# Patient Record
Sex: FEMALE | Race: BLACK OR AFRICAN AMERICAN | NOT HISPANIC OR LATINO | Employment: OTHER | URBAN - METROPOLITAN AREA
[De-identification: names, ages, dates, MRNs, and addresses within clinical notes are randomized per-mention and may not be internally consistent; named-entity substitution may affect disease eponyms.]

---

## 2019-05-21 PROBLEM — D63.1 ANEMIA DUE TO CHRONIC KIDNEY DISEASE: Status: ACTIVE | Noted: 2019-05-21

## 2019-05-21 PROBLEM — N18.9 ANEMIA DUE TO CHRONIC KIDNEY DISEASE: Status: ACTIVE | Noted: 2019-05-21

## 2019-07-09 DIAGNOSIS — Z76.82 ORGAN TRANSPLANT CANDIDATE: Primary | ICD-10-CM

## 2019-07-15 ENCOUNTER — TELEPHONE (OUTPATIENT)
Dept: TRANSPLANT | Facility: CLINIC | Age: 69
End: 2019-07-15

## 2019-08-01 DIAGNOSIS — Z76.82 ORGAN TRANSPLANT CANDIDATE: Primary | ICD-10-CM

## 2019-09-19 ENCOUNTER — HOSPITAL ENCOUNTER (OUTPATIENT)
Dept: RADIOLOGY | Facility: HOSPITAL | Age: 69
Discharge: HOME | End: 2019-09-19
Attending: NURSE PRACTITIONER
Payer: MEDICARE

## 2019-09-19 ENCOUNTER — CLINICAL SUPPORT (OUTPATIENT)
Dept: INFECTIOUS DISEASES | Facility: CLINIC | Age: 69
End: 2019-09-19
Payer: MEDICARE

## 2019-09-19 ENCOUNTER — OFFICE VISIT (OUTPATIENT)
Dept: TRANSPLANT | Facility: CLINIC | Age: 69
End: 2019-09-19
Payer: MEDICARE

## 2019-09-19 VITALS
BODY MASS INDEX: 34.85 KG/M2 | SYSTOLIC BLOOD PRESSURE: 173 MMHG | WEIGHT: 209.19 LBS | DIASTOLIC BLOOD PRESSURE: 83 MMHG | HEART RATE: 75 BPM | TEMPERATURE: 98 F | OXYGEN SATURATION: 97 % | HEIGHT: 65 IN | RESPIRATION RATE: 18 BRPM

## 2019-09-19 DIAGNOSIS — Z01.818 PRE-TRANSPLANT EVALUATION FOR CHRONIC KIDNEY DISEASE: ICD-10-CM

## 2019-09-19 DIAGNOSIS — Z76.82 ORGAN TRANSPLANT CANDIDATE: ICD-10-CM

## 2019-09-19 DIAGNOSIS — E11.22 CONTROLLED TYPE 2 DIABETES MELLITUS WITH STAGE 4 CHRONIC KIDNEY DISEASE, WITH LONG-TERM CURRENT USE OF INSULIN: ICD-10-CM

## 2019-09-19 DIAGNOSIS — I25.2 HISTORY OF MYOCARDIAL INFARCTION: Chronic | ICD-10-CM

## 2019-09-19 DIAGNOSIS — N18.4 CKD (CHRONIC KIDNEY DISEASE), STAGE IV: Primary | ICD-10-CM

## 2019-09-19 DIAGNOSIS — E78.2 MIXED HYPERLIPIDEMIA: ICD-10-CM

## 2019-09-19 DIAGNOSIS — N18.4 CONTROLLED TYPE 2 DIABETES MELLITUS WITH STAGE 4 CHRONIC KIDNEY DISEASE, WITH LONG-TERM CURRENT USE OF INSULIN: ICD-10-CM

## 2019-09-19 DIAGNOSIS — Z79.4 CONTROLLED TYPE 2 DIABETES MELLITUS WITH STAGE 4 CHRONIC KIDNEY DISEASE, WITH LONG-TERM CURRENT USE OF INSULIN: ICD-10-CM

## 2019-09-19 PROBLEM — I50.9 CHF (CONGESTIVE HEART FAILURE): Status: ACTIVE | Noted: 2019-09-19

## 2019-09-19 PROBLEM — I21.9 MYOCARDIAL INFARCTION: Chronic | Status: ACTIVE | Noted: 2019-09-19

## 2019-09-19 PROBLEM — Z86.79 HISTORY OF CHF (CONGESTIVE HEART FAILURE): Status: ACTIVE | Noted: 2019-09-19

## 2019-09-19 PROBLEM — I50.9 CHF (CONGESTIVE HEART FAILURE): Status: RESOLVED | Noted: 2019-09-19 | Resolved: 2019-09-19

## 2019-09-19 PROBLEM — E78.5 HYPERLIPIDEMIA: Status: ACTIVE | Noted: 2019-09-19

## 2019-09-19 PROCEDURE — 99205 PR OFFICE/OUTPT VISIT, NEW, LEVL V, 60-74 MIN: ICD-10-PCS | Mod: S$PBB,TXP,, | Performed by: INTERNAL MEDICINE

## 2019-09-19 PROCEDURE — 99204 OFFICE O/P NEW MOD 45 MIN: CPT | Mod: S$PBB,TXP,, | Performed by: PHYSICIAN ASSISTANT

## 2019-09-19 PROCEDURE — 71046 XR CHEST PA AND LATERAL: ICD-10-PCS | Mod: 26,TXP,, | Performed by: RADIOLOGY

## 2019-09-19 PROCEDURE — 71046 X-RAY EXAM CHEST 2 VIEWS: CPT | Mod: TC,TXP

## 2019-09-19 PROCEDURE — 93978 VASCULAR STUDY: CPT | Mod: 26,TXP,, | Performed by: RADIOLOGY

## 2019-09-19 PROCEDURE — 71046 X-RAY EXAM CHEST 2 VIEWS: CPT | Mod: 26,TXP,, | Performed by: RADIOLOGY

## 2019-09-19 PROCEDURE — 72170 X-RAY EXAM OF PELVIS: CPT | Mod: 26,TXP,, | Performed by: RADIOLOGY

## 2019-09-19 PROCEDURE — 90750 HZV VACC RECOMBINANT IM: CPT | Mod: PBBFAC,TXP

## 2019-09-19 PROCEDURE — 90662 IIV NO PRSV INCREASED AG IM: CPT | Mod: PBBFAC,TXP

## 2019-09-19 PROCEDURE — 72170 XR PELVIS ROUTINE AP: ICD-10-PCS | Mod: 26,TXP,, | Performed by: RADIOLOGY

## 2019-09-19 PROCEDURE — 72170 X-RAY EXAM OF PELVIS: CPT | Mod: TC,TXP

## 2019-09-19 PROCEDURE — G0008 ADMIN INFLUENZA VIRUS VAC: HCPCS | Mod: PBBFAC,TXP

## 2019-09-19 PROCEDURE — 99999 PR PBB SHADOW E&M-EST. PATIENT-LVL IV: ICD-10-PCS | Mod: PBBFAC,TXP,, | Performed by: INTERNAL MEDICINE

## 2019-09-19 PROCEDURE — 99204 PR OFFICE/OUTPT VISIT, NEW, LEVL IV, 45-59 MIN: ICD-10-PCS | Mod: S$PBB,TXP,, | Performed by: PHYSICIAN ASSISTANT

## 2019-09-19 PROCEDURE — 99214 OFFICE O/P EST MOD 30 MIN: CPT | Mod: PBBFAC,25,TXP | Performed by: INTERNAL MEDICINE

## 2019-09-19 PROCEDURE — 97802 MEDICAL NUTRITION INDIV IN: CPT | Mod: PBBFAC,TXP | Performed by: DIETITIAN, REGISTERED

## 2019-09-19 PROCEDURE — 99999 PR PBB SHADOW E&M-EST. PATIENT-LVL IV: CPT | Mod: PBBFAC,TXP,, | Performed by: INTERNAL MEDICINE

## 2019-09-19 PROCEDURE — 93978 US DOPP ILIACS BILATERAL: ICD-10-PCS | Mod: 26,TXP,, | Performed by: RADIOLOGY

## 2019-09-19 PROCEDURE — 99203 OFFICE O/P NEW LOW 30 MIN: CPT | Mod: S$PBB,TXP,, | Performed by: TRANSPLANT SURGERY

## 2019-09-19 PROCEDURE — 76770 US EXAM ABDO BACK WALL COMP: CPT | Mod: TC,TXP

## 2019-09-19 PROCEDURE — 90471 IMMUNIZATION ADMIN: CPT | Mod: PBBFAC,TXP

## 2019-09-19 PROCEDURE — 99203 PR OFFICE/OUTPT VISIT, NEW, LEVL III, 30-44 MIN: ICD-10-PCS | Mod: S$PBB,TXP,, | Performed by: TRANSPLANT SURGERY

## 2019-09-19 PROCEDURE — 99205 OFFICE O/P NEW HI 60 MIN: CPT | Mod: S$PBB,TXP,, | Performed by: INTERNAL MEDICINE

## 2019-09-19 PROCEDURE — G0010 ADMIN HEPATITIS B VACCINE: HCPCS | Mod: PBBFAC,TXP

## 2019-09-19 PROCEDURE — 93978 VASCULAR STUDY: CPT | Mod: TC,TXP

## 2019-09-19 PROCEDURE — 76770 US EXAM ABDO BACK WALL COMP: CPT | Mod: 26,TXP,, | Performed by: RADIOLOGY

## 2019-09-19 PROCEDURE — 76770 US RETROPERITONEAL COMPLETE: ICD-10-PCS | Mod: 26,TXP,, | Performed by: RADIOLOGY

## 2019-09-19 RX ORDER — ACETAMINOPHEN 500 MG
2000 TABLET ORAL DAILY
COMMUNITY

## 2019-09-19 RX ORDER — NIFEDIPINE 60 MG/1
60 TABLET, EXTENDED RELEASE ORAL 2 TIMES DAILY
COMMUNITY
End: 2019-11-04 | Stop reason: CLARIF

## 2019-09-19 RX ORDER — ACETAMINOPHEN AND CODEINE PHOSPHATE 300; 30 MG/1; MG/1
1 TABLET ORAL EVERY 6 HOURS PRN
COMMUNITY
End: 2019-12-05

## 2019-09-19 RX ORDER — DOCUSATE SODIUM 100 MG/1
100 CAPSULE, LIQUID FILLED ORAL DAILY
COMMUNITY
End: 2019-12-05

## 2019-09-19 RX ORDER — LORATADINE 10 MG/1
10 TABLET ORAL DAILY
COMMUNITY
End: 2019-11-15 | Stop reason: SDUPTHER

## 2019-09-19 NOTE — PROGRESS NOTES
PHARM.D. PRE-TRANSPLANT NOTE:    This patient's medication therapy was evaluated as part of her pre-transplant evaluation.      The following general pharmacologic concerns were noted: aspirin 81mg     The following pharmacologic concerns related to HCV therapy were noted: none      This patient's medication profile was reviewed for contraindications for DAA Hepatitis C therapy:    [x]  No current inducers of CYP 3A4 or PGP  [x]  No amiodarone on this patient's EMR profile in the last 24 months  [x]  No past or current atrial fibrillation on this patient's EMR profile       Current Outpatient Medications   Medication Sig Dispense Refill    acetaminophen-codeine 300-30mg (TYLENOL #3) 300-30 mg Tab Take 1 tablet by mouth every 6 (six) hours as needed (pain).      aspirin 81 MG Chew Take 81 mg by mouth once daily.      atorvastatin (LIPITOR) 80 MG tablet Take 80 mg by mouth once daily.      butalbital-acetaminophen-caffeine -40 mg (FIORICET, ESGIC) -40 mg per tablet Take 1 tablet by mouth every 4 (four) hours as needed for Pain.      carvedilol (COREG) 25 MG tablet Take 25 mg by mouth 2 (two) times daily with meals.      cloNIDine (CATAPRES) 0.2 MG tablet Take 0.2 mg by mouth 2 (two) times daily.       clopidogrel (PLAVIX) 75 mg tablet Take 75 mg by mouth once daily.      docusate sodium (COLACE) 100 MG capsule Take 100 mg by mouth once daily.      doxazosin (CARDURA) 4 MG tablet Take 4 mg by mouth 2 (two) times daily.      fish oil-omega-3 fatty acids 300-1,000 mg capsule Take by mouth 2 (two) times daily.      furosemide (LASIX) 80 MG tablet Take 80 mg by mouth once daily.       insulin degludec (TRESIBA FLEXTOUCH U-100) 100 unit/mL (3 mL) InPn Inject 30 Units into the skin 2 (two) times daily.       isosorbide mononitrate (IMDUR) 60 MG 24 hr tablet Take 60 mg by mouth once daily.      loratadine (CLARITIN) 10 mg tablet Take 10 mg by mouth once daily.      NIFEdipine (PROCARDIA-XL) 60 MG  (OSM) 24 hr tablet Take 60 mg by mouth 2 (two) times daily.      SITagliptin (JANUVIA) 25 MG Tab Take 25 mg by mouth once daily.      vitamin D (VITAMIN D3) 1000 units Tab Take 1,000 Units by mouth once daily.       No current facility-administered medications for this visit.          Currently Ms Swain is responsible for preparing / administering this patient's medications on a daily basis.  I am available for consultation and can be contacted, as needed by the other members of the Kidney Transplant team.

## 2019-09-19 NOTE — PROGRESS NOTES
Transplant Surgery  Kidney Transplant Recipient Evaluation    Referring Physician: Dane Varghese  Current Nephrologist: Dane Varghese    Subjective:     Reason for Visit: evaluate transplant candidacy    History of Present Illness: Latia Swain is a 69 y.o. year old female undergoing transplant evaluation.    Dialysis History: Latia is pre-dialysis.      Transplant History: N/A    Etiology of Renal Disease: Diabetes Mellitus - Type II (based on medical records from referral).    Review of Systems    Objective:     Physical Exam:  Constitutional:   Vitals reviewed: yes   Well-nourished and well-groomed: yes  Eyes:   Sclerae icteric: no   Extraocular movements intact: yes  GI:    Bowel sounds normal: yes   Tenderness: no    If yes, quadrant/location: not applicable   Palpable masses: no    If yes, quadrant/location: not applicable   Hepatosplenomegaly: no   Ascites: no   Hernia: no    If yes, type/location: not applicable   Surgical scars: yes    If yes, type/location: midline  Resp:   Effort normal: yes   Breath sounds normal: yes    CV:   Regular rate and rhythm: yes   Heart sounds normal: yes   Femoral pulses normal: yes   Extremities edematous: no  Skin:   Rashes or lesions present: no    If yes, describe:not applicable   Jaundice:: no    Musculoskeletal:   Gait normal: yes   Strength normal: yes  Psych:   Oriented to person, place, and time: yes   Affect and mood normal: yes    Additional comments: not applicable    Counseling: We provided Latia Swain with a group education session today.  We discussed kidney transplantation at length with her, including risks, potential complications, and alternatives in the management of her renal failure.  The discussion included complications related to anesthesia, bleeding, infection, primary nonfunction, and ATN.  I discussed the typical postoperative course, length of hospitalization, the need for long-term immunosuppression, and the need for long-term routine  follow-up.  I discussed living-donor and -donor transplantation and the relative advantages and disadvantages of each.  I also discussed average waiting times for both living donation and  donation.  I discussed national and center-specific survival rates.  I also mentioned the potential benefit of multicenter listing to candidates listed with centers within more than one organ procurement organization.  All questions were answered.    Final determination of transplant candidacy will be made once evaluation is complete and reviewed by the Kidney & Kidney/Pancreas Selection Committee.         Transplant Surgery - Candidacy   Assessment/Plan:   Latia Swain has end stage renal disease (ESRD) on dialysis. I see no surgical contraindication to placing a kidney transplant. Based on available information, Latia Swain is a marginal kidney transplant candidate due to medical co morbidities.     Rogelio Bashir MD

## 2019-09-19 NOTE — PROGRESS NOTES
TRANSPLANT NUTRITIONAL ASSESSMENT    Referring Provider: Kassy Gagnon MD    Reason for Visit: Pre-kidney transplant work-up (pre-dialysis)    Age: 69 y.o.  Sex: female    Patient Active Problem List   Diagnosis    Anemia due to chronic kidney disease    CKD stage IV    Controlled type 2 diabetes mellitus with CKD IV using insulin    History of CHF (congestive heart failure)    Hyperlipidemia    History of myocardial infarction 2008     Past Medical History:   Diagnosis Date    Allergy     Anemia     Cataract     left 2009, right 2010    CHF (congestive heart failure)     CKD stage V 9/19/2019    Controlled type 2 diabetes mellitus with CKD IV using insulin 9/19/2019    Diabetes mellitus, type 2     Disorder of kidney and ureter     chronic kidney disease dx 5/2018    Hyperlipidemia     Hypertension     Myocardial infarction     2008     Lab Results   Component Value Date     (H) 09/19/2019    K 4.4 09/19/2019    PHOS 3.6 09/19/2019    CHOL 184 09/19/2019    HDL 25 (L) 09/19/2019    TRIG 254 (H) 09/19/2019    ALBUMIN 2.9 (L) 09/19/2019    HGBA1C 8.5 (H) 09/19/2019    CALCIUM 9.0 09/19/2019     Other Pertinent Labs: None    Current Outpatient Medications   Medication Sig    acetaminophen-codeine 300-30mg (TYLENOL #3) 300-30 mg Tab Take 1 tablet by mouth every 6 (six) hours as needed (pain).    aspirin 81 MG Chew Take 81 mg by mouth once daily.    atorvastatin (LIPITOR) 80 MG tablet Take 80 mg by mouth once daily.    butalbital-acetaminophen-caffeine -40 mg (FIORICET, ESGIC) -40 mg per tablet Take 1 tablet by mouth every 4 (four) hours as needed for Pain.    carvedilol (COREG) 25 MG tablet Take 25 mg by mouth 2 (two) times daily with meals.    cloNIDine (CATAPRES) 0.2 MG tablet Take 0.2 mg by mouth 2 (two) times daily.     clopidogrel (PLAVIX) 75 mg tablet Take 75 mg by mouth once daily.    docusate sodium (COLACE) 100 MG capsule Take 100 mg by mouth once daily.  "   doxazosin (CARDURA) 4 MG tablet Take 4 mg by mouth 2 (two) times daily.    fish oil-omega-3 fatty acids 300-1,000 mg capsule Take by mouth 2 (two) times daily.    furosemide (LASIX) 80 MG tablet Take 80 mg by mouth once daily.     insulin degludec (TRESIBA FLEXTOUCH U-100) 100 unit/mL (3 mL) InPn Inject 30 Units into the skin 2 (two) times daily.     isosorbide mononitrate (IMDUR) 60 MG 24 hr tablet Take 60 mg by mouth once daily.    loratadine (CLARITIN) 10 mg tablet Take 10 mg by mouth once daily.    NIFEdipine (PROCARDIA-XL) 60 MG (OSM) 24 hr tablet Take 60 mg by mouth 2 (two) times daily.    SITagliptin (JANUVIA) 25 MG Tab Take 25 mg by mouth once daily.    vitamin D (VITAMIN D3) 1000 units Tab Take 1,000 Units by mouth once daily.     No current facility-administered medications for this visit.      Allergies: Penicillins    Ht Readings from Last 1 Encounters:   09/19/19 5' 5.16" (1.655 m)     Wt Readings from Last 1 Encounters:   09/19/19 94.9 kg (209 lb 3.5 oz)      BMI: Body mass index is 34.65 kg/m².    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 Has lost ~10lb in past couple of months (4.5% weight change). Pt cooks, " still uses salt but has cut back, though per diet recall pt eating mostly salads. Has tried to stop eating out but ate out last weekend due to fatigue and not wanting to cook. Monitoring intake of high Phos and K foods. Beverages include water and regular lemonade. Eating few protein foods. Pt provided the following diet recall:  B: wheat toast with margarine, coffee with creamer  L: salad with vinaigrette, sometimes cheese or egg, onion, sometimes tomato, cucumbers  D: salad with chopped turkey or chicken or red beans and rice with sausage  Snacks: plums, peaches, strawberries, apple    Nutritional Diagnoses  Problem: food- and nutrition-related knowledge deficit  Etiology: inadequate education related to protein/micronutrient needs in CKD  Symptoms: diet recall    Educational Need? yes  Barriers: none identified  Discussed with: patient and family members  Interventions: Patient taught nutrition information regarding Pre-kidney transplant work-up (pre-dialysis).  Renal Nutrition Therapy packet reviewed (high/low food sources of K, Phos and protein, low sodium and fluid intake, emphasis on moderate protein intake). Encouraged pt to have adequate but not excessive protein intake.  Goals/Recommendations: increase protein intake, limit high potassium foods, limit intake of high phosphorus foods, limit intake of concentrated sweets and limit high sodium foods  Actions Taken: instruct/provide written information  Strategies Used: problem solving, goal setting, motivational interviewing  Patient and/or family comprehend instructions: yes , adherence expected  Outcome: Verbalizes understanding  Monitoring: Contact information provided, will f/u in clinic and communicate with the care team as needed.     Counseling Time: 15 minutes

## 2019-09-19 NOTE — PROGRESS NOTES
INITIAL PATIENT EDUCATION NOTE    Ms. Latia Swain was seen in pre-kidney transplant clinic for evaluation for kidney, kidney/pancreas or pancreas only transplant.  The patient attended a group education session that discussed/reviewed the following aspects of transplantation: evaluation and selection committee process, UNOS waitlist management/multiple listings, types of organs offered (KDPI < 85%, KDPI > 85%, PHS increased risk, DCD, HCV+), financial aspects, surgical procedures, dietary instruction pre- and post-transplant, health maintenance pre- and post-transplant, post-transplant hospitalization and outpatient follow-up, potential to participate in a research protocol, and medication management and side effects.  A question and answer session was provided after the presentation.    The patient was seen by all members of the multi-disciplinary team to include: Nephrologist/PA, Surgeon, , Transplant Coordinator, , Pharmacist and Dietician (if applicable).    The patient reviewed and signed all consents for evaluation which were witnessed and sent to scanning into the EPIC chart.    The patient was given an education book and plan for further evaluation based on her individual assessment.      The patient was encouraged to call with any questions or concerns.

## 2019-09-19 NOTE — PATIENT INSTRUCTIONS
From your doctor:  Thank you for visiting us today and considering kidney transplantation.   TO DO:  -See your heart doctor and get stress test + echocardiogram. He will need to clear you to get on kidney transplant.    -You at HIGHER than average risk of complications after transplant because of your history of diabetes and heart disease. This risk is largely related to effects of immunosuppression on your body's ability to heal and the stress these medications place on your body. If you develop a complication, you will have a harder time recovering from it.  Your major risks are:  -Long hospital stay after transplant  -Need dialysis after the transplant  -Heart & lung complications such as heart attack, heart failure, breathing problems/respiratory failure  -Infections, which are harder to treat because of the antirejection medicines  -Poor wound healing: wound infections and needing to heal an open wound  -Loss of current mobility with need for prolonged therapy to recover  -Death from any of the above     Try to lose weight if you can  Stay active    Things to work on to lower your risk:  -Increase your physical activity; ask your providers about physical therapy or doing exercises on your own  -Lose 20 lbs or more  -Quit all tobacco, if you are currently using any      Please feel free to contact us with any questions or concerns.  Regards,  Dr. Sweetie lAexander

## 2019-09-19 NOTE — PROGRESS NOTES
Pre Transplant Infectious Diseases Consult  Kidney Transplant Recipient Evaluation    Reason for Visit:  Pre Transplant Evaluation    Organ:  Kidney    Etiology of Kidney Disease:  DM    History of Prior Transplant:  No    Currently taking immunosuppressants/steroids:  No    History of Splenectomy:  No    Infectious History:  Current/recent infections or currently taking antibiotics?  No  History of recurrent infections (sinuses, throat, bladder/kidneys, intestines, skin, dental, lung, catheter (HD/PD) related, or peritonitis/SBP)?  Yes; sinusitis  Any major hospitalizations due to infection?  No  If diabetic, history of diabetic foot infection/osteomyelitis?  No  History of shingles?  Yes; 10 years ago  History of STDs (syphilis, viral hepatitis, HIV)?  No  Exposure to TB or ever had a positive TB skin test?  No  History of residence in coccidioides endemic areas ([facility]?  No  Any foreign travel?  No     Social/Environmental:  Occupational: ; caregiver   Animal exposures (dogs, cats, farm animals, bird cages, fish tanks):  Yes - dog  Hobbies (gardening, hike, fish/hunting, etc): indoor  Consumption of raw/undercooked meat or seafood?  No  Any injectable or smoked recreational drug use?  No    Immunization History:  Childhood vaccines:  Yes  Last Flu shot: 2008  Tetanus/TDAP: > 10 years  Hepatitis A: never  Hepatitis B: never  Prevnar-13: never  Pneumovax-23: 2008  Shingles (Zostavax/Shingrix): never    Serologies:  No results found for: CMVIGGINTERP, HEPAIGG, HEPBCAB, HEPBSAB, HEPBSAG, HEPBCAB, VLQ17MTWS, TBGOLDPLUS, RPR, STRONGANTIGG, TOXOIGG, TOXOG, VARICELLAINT     Review of Systems   Constitution: Negative for chills, decreased appetite, fever, malaise/fatigue, night sweats, weight gain and weight loss.   HENT: Negative for congestion, ear pain, hearing loss, hoarse voice, sore throat and tinnitus.    Eyes: Negative for blurred vision, redness and visual disturbance.   Cardiovascular:  Negative for chest pain, leg swelling and palpitations.   Respiratory: Negative for cough, hemoptysis, shortness of breath, sputum production and wheezing.    Endocrine: Negative for cold intolerance and heat intolerance.   Hematologic/Lymphatic: Negative for adenopathy. Does not bruise/bleed easily.   Skin: Negative for dry skin, itching, rash and suspicious lesions.   Musculoskeletal: Negative for back pain, joint pain, myalgias and neck pain.   Gastrointestinal: Negative for abdominal pain, constipation, diarrhea, heartburn, nausea and vomiting.   Genitourinary: Negative for dysuria, flank pain, frequency, hematuria, hesitancy and urgency.   Neurological: Negative for dizziness, headaches, numbness, paresthesias and weakness.   Psychiatric/Behavioral: Negative for depression and memory loss. The patient does not have insomnia and is not nervous/anxious.    Allergic/Immunologic: Negative for environmental allergies, HIV exposure, hives and persistent infections.     Physical Exam   Constitutional: She is oriented to person, place, and time. She appears well-developed and well-nourished. No distress.   HENT:   Head: Normocephalic and atraumatic.   Mouth/Throat: Uvula is midline, oropharynx is clear and moist and mucous membranes are normal. No oral lesions.   Eyes: Pupils are equal, round, and reactive to light. Conjunctivae and EOM are normal. No scleral icterus.   Neck: Normal range of motion.   Cardiovascular: Normal rate, regular rhythm and normal heart sounds. Exam reveals no gallop and no friction rub.   No murmur heard.  Pulmonary/Chest: Effort normal and breath sounds normal. No respiratory distress. She has no wheezes. She has no rales.   Abdominal: Soft. Bowel sounds are normal. She exhibits no distension and no mass. There is no hepatosplenomegaly. There is no tenderness. There is no rebound and no guarding.   Musculoskeletal: She exhibits edema.   Lymphadenopathy:        Head (right side): No submental,  no submandibular, no tonsillar, no preauricular, no posterior auricular and no occipital adenopathy present.        Head (left side): No submental, no submandibular, no tonsillar, no preauricular, no posterior auricular and no occipital adenopathy present.     She has no cervical adenopathy.     She has no axillary adenopathy.        Right: No inguinal, no supraclavicular and no epitrochlear adenopathy present.        Left: No inguinal, no supraclavicular and no epitrochlear adenopathy present.   Neurological: She is alert and oriented to person, place, and time.   Skin: Skin is warm, dry and intact. No rash noted.   Psychiatric: She has a normal mood and affect.            Counseling:   I discussed with the patient the risk for increased susceptibility to infections following transplantation including increased risk for infection right after transplant and if rejection should occur.  The patient has been counseled on the importance of vaccinations including but not limited to a yearly flu vaccine. Patient was also instructed to encourage that family/caretakers receive their flu vaccine yearly. The patient was encouraged to contact us about any problems that may develop after immunizations and possible side effects were reviewed.     Specific guidance has been provided to the patient regarding the patient's occupation, hobbies and activities to avoid future infectious complications. These include but are not limited to: avoiding raw/undercooked meats and seafood, avoiding unpasteurized milk/cheeses, proper (hand) hygiene, contact with animals and appropriate vaccination of animals, use of mosquito/tick precautions, avoiding walking barefoot, avoiding sick contacts, and seeking medical advice prior to foreign travel (specifically developing countries).     Transplant Candidacy: Based on available information, there are no identified significant barriers to transplantation from an infectious disease standpoint pending  acceptable serologies and subject to recommendations below.     Final determination of transplant candidacy will be made once evaluation is complete and reviewed by the Transplant Selection Committee.      ID recommendations:      Quantiferon gold, HIV, strongyloides IgG and RPR pending. If positive, please refer to ID clinic.    Vaccines:  Flu  TDAP  shingrix series  Hep A series  Hep B series  prevnar-13 followed by pneumovax-23 in 2 months    Jacquelyn Luna PA-C

## 2019-09-19 NOTE — PROGRESS NOTES
Transplant Nephrology  Kidney Transplant Recipient Evaluation    Referring Physician: Dane Varghese  Current Nephrologist: Dane Varghese    Subjective:   CC:  Initial evaluation of kidney transplant candidacy.    HPI:  Ms. Swain is a 69 y.o. year old Black or  female who has presented to be evaluated as a potential kidney transplant recipient.  She has advanced kidney disease secondary to diabetic nephropathy.  Patient is currently pre-dialysis. She has a no for dialysis access.     Previous Transplant: gris Jeffery presents feeling well. She was diagnosed with DM at age 56 w/o retinopathy, gastropathy, or neuropathy. She notes being told she had MI and heart failure in 2008, but does not have details. She follows with a cardiologist at [facility]. She denies hospital admission in last 10 years. She remains active driving, shopping, doing household chores, and works as a caregiver.     She denies any history of  issues such as UTIs or kidney stones. Latia reports no LUTS.  She may have kidney donors.    Past Medical and Surgical History: Ms. Swain  has a past medical history of Allergy, Anemia, Cataract, CHF (congestive heart failure), CKD stage V, Controlled type 2 diabetes mellitus with CKD IV using insulin, Diabetes mellitus, type 2, Disorder of kidney and ureter, Hyperlipidemia, Hypertension, and Myocardial infarction.  She has a past surgical history that includes Exploratory laparotomy (1969).    Past Social and Family History: Ms. Swain reports that she has never smoked. She has never used smokeless tobacco. She reports that she drank alcohol. She reports that she does not use drugs. Her family history includes Arthritis in her mother; Cancer in her brother and father; Diabetes in her grandchild, mother, and sister; Early death in her brother; Hyperlipidemia in her sister; Hypertension in her sister; Kidney disease in her grandchild; Mental retardation in her sister; Stroke in her  "mother.    Review of Systems   Constitutional: Positive for fatigue. Negative for unexpected weight change.   HENT: Negative for hearing loss and mouth sores.    Eyes: Negative for visual disturbance.   Respiratory: Negative for shortness of breath.    Cardiovascular: Negative for chest pain.   Gastrointestinal: Negative for abdominal pain, nausea and vomiting.   Genitourinary: Negative for decreased urine volume and dysuria.   Musculoskeletal: Negative for gait problem.   Skin: Negative for rash.   Neurological: Negative for seizures.   Hematological: Does not bruise/bleed easily.   Psychiatric/Behavioral: Negative for sleep disturbance.      Objective:   Blood pressure (!) 173/83, pulse 75, temperature 98.2 °F (36.8 °C), temperature source Oral, resp. rate 18, height 5' 5.16" (1.655 m), weight 94.9 kg (209 lb 3.5 oz), SpO2 97 %.body mass index is 34.65 kg/m².    Physical Exam   Constitutional: She is oriented to person, place, and time. She is cooperative. No distress.   HENT:   Mouth/Throat: Mucous membranes are normal. No oral lesions.   Eyes: Conjunctivae and lids are normal. No scleral icterus.   Neck: Trachea normal. Neck supple. No JVD present. No thyroid mass present.   Cardiovascular: Normal rate and regular rhythm.   Pulmonary/Chest: Effort normal. She has no rales.   Abdominal: Soft. She exhibits no mass. There is no hepatosplenomegaly. There is no tenderness. There is no CVA tenderness.   Musculoskeletal: Normal range of motion. She exhibits no edema.   Neurological: She is alert and oriented to person, place, and time. She displays no tremor. Gait normal.   Skin: Skin is warm and dry. No lesion and no rash noted. No cyanosis. Nails show no clubbing.   Psychiatric: She has a normal mood and affect. Her speech is normal. Cognition and memory are normal.     Labs:  Lab Results   Component Value Date    WBC 11.30 09/01/2019    HGB 11.4 (L) 09/01/2019    HCT 36.4 (L) 09/01/2019     09/19/2019    K 4.4 " 09/19/2019     09/19/2019    CO2 26 09/19/2019    BUN 42 (H) 09/19/2019    CREATININE 3.1 (H) 09/19/2019    EGFRNONAA 14.8 (A) 09/19/2019    CALCIUM 9.0 09/19/2019    PHOS 3.6 09/19/2019    ALBUMIN 2.9 (L) 09/19/2019    AST 16 09/19/2019    ALT 14 09/19/2019    [id number] 22 05/09/2018    .0 (H) 09/19/2019     Lab Results   Component Value Date    BILIRUBINUA Negative 05/09/2018    PROTEINUA 100 (A) 05/09/2018    NITRITE Negative 05/09/2018    RBCUA 3 05/09/2018    WBCUA 2 05/09/2018     Labs were reviewed with the patient.    Assessment:     1. CKD stage IV    2. Controlled type 2 diabetes mellitus with CKD IV using insulin    3. Mixed hyperlipidemia    4. History of myocardial infarction 2008    5. Pre-transplant evaluation for chronic kidney disease    6. BMI 34.0-34.9,adult        Plan:     Transplant Candidacy:   Based on available information, Ms. Swain is a high-risk kidney transplant candidate based on DM, CHF/MI, central obesity and age. We discussed these factors and risk modification.   In addition to routine testing already scheduled per protocol, her workup will consist of routine health maintenance [colonoscopy, a well woman exam, mammogram], stress test with echocardiogram and clearance by her own cardiologist.    Meets center eligibility for accepting HCV+ donor offer - yes.  Patient educated on HCV+ donors. Latia is willing to accept HCV+ donor offer - yes   Patient is a candidate for KDPI > 85 kidney donor offer - no.  Final determination of transplant candidacy will be made once workup is complete and reviewed by the selection committee.    Management of kidney disease and related issues (HTN, anemia, SPTH, metabolic bone disease) should continue by community nephrologist.     Kassy Gagnon MD       [facility] Patient Status  Functional Status: 80% - Normal activity with effort: some symptoms of disease  Physical Capacity: No Limitations

## 2019-09-19 NOTE — LETTER
September 24, 2019        Dane Varghese  [address]  [place] [address]  Phone: [phone]  Fax: [phone]             [address]  [address]  [facility] [address]  Phone: [phone]   Patient: Latia Swain   MR Number: [id number]   YOB: 1950   Date of Visit: 9/19/2019       Dear Dr. Dane Varghese    Thank you for referring Latia Swain to me for evaluation. Attached you will find relevant portions of my assessment and plan of care.    If you have questions, please do not hesitate to call me. I look forward to following Latia Swain along with you.    Sincerely,    Kassy Gagnon MD    Enclosure    If you would like to receive this communication electronically, please contact [email or web address] or [phone] to request Stayful Link access.    Stayful Link is a tool which provides read-only access to select patient information with whom you have a relationship. Its easy to use and provides real time access to review your patients record including encounter summaries, notes, results, and demographic information.    If you feel you have received this communication in error or would no longer like to receive these types of communications, please e-mail [email or web address]

## 2019-09-19 NOTE — LETTER
September 19, 2019    Latia Swain  [address] [place] [address]     Dear Dr. Dane Varghese,     Patient: Latia Swain   MR Number: [id number]   YOB: 1950     A battery of tests must be done to determine if you are suitable health to undergo a kidney transplant.  All of the recommended studies must be completed and received by the transplant team before you can be presented to the transplant selection committee.  The committee will then decide if you are suitable transplant candidate.  The following studies need to be obtained at home:    _X__Mammography: ICD-10 code Z12.31.    _X__Gynecologic exam: The need for a pap smear will be determined by gynecologist.    _X__Colonoscopy: This is a recommended screening tests for all adults over the age of 50, or those considered at risk for colon cancer.  ICD-10 code Z12.11.    _X__Cardiac stress test: We request you to have a stress test to determine if you have any evidence of blockages in your heart.  We recommend a nuclear stress test or dobutamine stress echo.  ICD-10 code N19.    _X__2-D echo with color flow doppler: We need to look at the heart valves and heart muscle, and determine pulmonary artery pressures.  ICD-10 code N19.    _X__Cardiology consult: We are asking that your cardiologist clear you for transplant surgery and maximize your medical management.  We also need to note if there are special  management strategies that need to be used during your transplant event, especially since we routinely use IV fluids to help the new kidney function at its best.  Also, your heart doctor needs to know that the average wait for a kidney transplant can be as long as 3-5 years.  Therefore, we not only ask for a preoperative clearance, but also optimal management of your heart (for example: lipids, high blood pressure, heart failure, etc.).    You and your doctor should feel free to contact us at any time, if there are questions or concerns about  these tests or the transplant evaluation process.    Sincerely,    Katia Andujar RN    [facility]  [address] [place] [address]  [phone] office  [phone] fax

## 2019-09-19 NOTE — PROGRESS NOTES
Ms. Swain received Hepatitis A, Prevnar 13, Shingles, Heplisav B and Flu HD vaccines   Tolerated well  Left unit in NAD
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
## 2019-10-10 ENCOUNTER — TELEPHONE (OUTPATIENT)
Dept: TRANSPLANT | Facility: CLINIC | Age: 69
End: 2019-10-10

## 2019-10-16 DIAGNOSIS — Z76.82 ORGAN TRANSPLANT CANDIDATE: Primary | ICD-10-CM

## 2019-10-16 NOTE — PROGRESS NOTES
Patient informed that she will need a CT scan to assess for aorta/iliacs calcifications. All questions were answered and patient verbalized understanding.

## 2019-11-11 PROBLEM — Z12.11 COLON CANCER SCREENING: Status: ACTIVE | Noted: 2019-11-11

## 2019-12-18 PROBLEM — I16.0 HYPERTENSIVE URGENCY: Status: ACTIVE | Noted: 2019-12-18

## 2019-12-18 PROBLEM — J96.90 RESPIRATORY FAILURE: Status: ACTIVE | Noted: 2019-12-18

## 2019-12-18 PROBLEM — N18.5 CKD (CHRONIC KIDNEY DISEASE), STAGE V: Status: ACTIVE | Noted: 2019-12-18

## 2019-12-18 PROBLEM — E66.9 OBESITY (BMI 30-39.9): Status: ACTIVE | Noted: 2019-12-18

## 2019-12-19 PROBLEM — M89.9 CHRONIC KIDNEY DISEASE-MINERAL AND BONE DISORDER: Status: ACTIVE | Noted: 2019-12-19

## 2019-12-19 PROBLEM — J96.01 ACUTE HYPOXEMIC RESPIRATORY FAILURE: Status: ACTIVE | Noted: 2019-12-19

## 2019-12-19 PROBLEM — E83.9 CHRONIC KIDNEY DISEASE-MINERAL AND BONE DISORDER: Status: ACTIVE | Noted: 2019-12-19

## 2019-12-19 PROBLEM — N18.9 CHRONIC KIDNEY DISEASE-MINERAL AND BONE DISORDER: Status: ACTIVE | Noted: 2019-12-19

## 2019-12-19 PROBLEM — I10 ESSENTIAL HYPERTENSION, BENIGN: Status: ACTIVE | Noted: 2019-12-19

## 2020-01-01 ENCOUNTER — TELEPHONE (OUTPATIENT)
Dept: CARDIOLOGY | Facility: CLINIC | Age: 70
End: 2020-01-01

## 2020-01-01 ENCOUNTER — OFFICE VISIT (OUTPATIENT)
Dept: TRANSPLANT | Facility: CLINIC | Age: 70
End: 2020-01-01
Payer: MEDICARE

## 2020-01-01 ENCOUNTER — OFFICE VISIT (OUTPATIENT)
Dept: OBSTETRICS AND GYNECOLOGY | Facility: CLINIC | Age: 70
End: 2020-01-01
Payer: MEDICARE

## 2020-01-01 ENCOUNTER — CLINICAL SUPPORT (OUTPATIENT)
Dept: CARDIOLOGY | Facility: CLINIC | Age: 70
End: 2020-01-01
Attending: NURSE PRACTITIONER
Payer: MEDICARE

## 2020-01-01 ENCOUNTER — TELEPHONE (OUTPATIENT)
Dept: TRANSPLANT | Facility: CLINIC | Age: 70
End: 2020-01-01

## 2020-01-01 ENCOUNTER — HOSPITAL ENCOUNTER (OUTPATIENT)
Dept: RADIOLOGY | Facility: HOSPITAL | Age: 70
Discharge: HOME | End: 2020-12-15
Attending: NURSE PRACTITIONER
Payer: MEDICARE

## 2020-01-01 ENCOUNTER — SOCIAL WORK (OUTPATIENT)
Dept: TRANSPLANT | Facility: CLINIC | Age: 70
End: 2020-01-01

## 2020-01-01 VITALS
HEIGHT: 65 IN | WEIGHT: 213.88 LBS | BODY MASS INDEX: 35.63 KG/M2 | DIASTOLIC BLOOD PRESSURE: 54 MMHG | SYSTOLIC BLOOD PRESSURE: 95 MMHG | TEMPERATURE: 98 F | OXYGEN SATURATION: 99 % | HEART RATE: 75 BPM | RESPIRATION RATE: 16 BRPM

## 2020-01-01 VITALS
SYSTOLIC BLOOD PRESSURE: 121 MMHG | BODY MASS INDEX: 35.48 KG/M2 | OXYGEN SATURATION: 96 % | DIASTOLIC BLOOD PRESSURE: 58 MMHG | RESPIRATION RATE: 18 BRPM | WEIGHT: 212.94 LBS | TEMPERATURE: 96 F | HEIGHT: 65 IN | HEART RATE: 88 BPM

## 2020-01-01 VITALS — HEIGHT: 66 IN | BODY MASS INDEX: 35.2 KG/M2 | WEIGHT: 219 LBS

## 2020-01-01 VITALS
HEIGHT: 66 IN | SYSTOLIC BLOOD PRESSURE: 100 MMHG | DIASTOLIC BLOOD PRESSURE: 58 MMHG | WEIGHT: 218.69 LBS | BODY MASS INDEX: 35.15 KG/M2

## 2020-01-01 DIAGNOSIS — I25.2 HISTORY OF MYOCARDIAL INFARCTION: Chronic | ICD-10-CM

## 2020-01-01 DIAGNOSIS — Z12.31 ENCOUNTER FOR SCREENING MAMMOGRAM FOR MALIGNANT NEOPLASM OF BREAST: ICD-10-CM

## 2020-01-01 DIAGNOSIS — Z76.82 ORGAN TRANSPLANT CANDIDATE: Primary | ICD-10-CM

## 2020-01-01 DIAGNOSIS — E78.2 MIXED HYPERLIPIDEMIA: ICD-10-CM

## 2020-01-01 DIAGNOSIS — N18.9 CHRONIC KIDNEY DISEASE-MINERAL AND BONE DISORDER: ICD-10-CM

## 2020-01-01 DIAGNOSIS — M89.9 CHRONIC KIDNEY DISEASE-MINERAL AND BONE DISORDER: ICD-10-CM

## 2020-01-01 DIAGNOSIS — Z86.79 HISTORY OF CHF (CONGESTIVE HEART FAILURE): ICD-10-CM

## 2020-01-01 DIAGNOSIS — Z12.4 PAP SMEAR FOR CERVICAL CANCER SCREENING: ICD-10-CM

## 2020-01-01 DIAGNOSIS — N18.9 ANEMIA DUE TO CHRONIC KIDNEY DISEASE, UNSPECIFIED CKD STAGE: ICD-10-CM

## 2020-01-01 DIAGNOSIS — Z76.82 PATIENT ON WAITING LIST FOR KIDNEY TRANSPLANT: Primary | ICD-10-CM

## 2020-01-01 DIAGNOSIS — Z76.82 ORGAN TRANSPLANT CANDIDATE: ICD-10-CM

## 2020-01-01 DIAGNOSIS — N18.6 TYPE 2 DIABETES MELLITUS WITH CHRONIC KIDNEY DISEASE ON CHRONIC DIALYSIS, UNSPECIFIED WHETHER LONG TERM INSULIN USE: ICD-10-CM

## 2020-01-01 DIAGNOSIS — D63.1 ANEMIA DUE TO CHRONIC KIDNEY DISEASE, UNSPECIFIED CKD STAGE: ICD-10-CM

## 2020-01-01 DIAGNOSIS — I10 ESSENTIAL HYPERTENSION, BENIGN: ICD-10-CM

## 2020-01-01 DIAGNOSIS — N18.6 ESRD (END STAGE RENAL DISEASE): ICD-10-CM

## 2020-01-01 DIAGNOSIS — E83.9 CHRONIC KIDNEY DISEASE-MINERAL AND BONE DISORDER: ICD-10-CM

## 2020-01-01 DIAGNOSIS — I25.10 CORONARY ARTERY DISEASE, ANGINA PRESENCE UNSPECIFIED, UNSPECIFIED VESSEL OR LESION TYPE, UNSPECIFIED WHETHER NATIVE OR TRANSPLANTED HEART: ICD-10-CM

## 2020-01-01 DIAGNOSIS — E66.9 OBESITY (BMI 30-39.9): ICD-10-CM

## 2020-01-01 DIAGNOSIS — Z99.2 TYPE 2 DIABETES MELLITUS WITH CHRONIC KIDNEY DISEASE ON CHRONIC DIALYSIS, UNSPECIFIED WHETHER LONG TERM INSULIN USE: ICD-10-CM

## 2020-01-01 DIAGNOSIS — N18.5 CKD (CHRONIC KIDNEY DISEASE), STAGE V: ICD-10-CM

## 2020-01-01 DIAGNOSIS — Z01.419 ENCOUNTER FOR GYNECOLOGICAL EXAMINATION WITHOUT ABNORMAL FINDING: Primary | ICD-10-CM

## 2020-01-01 DIAGNOSIS — I10 MALIGNANT HYPERTENSION: ICD-10-CM

## 2020-01-01 DIAGNOSIS — E11.22 TYPE 2 DIABETES MELLITUS WITH CHRONIC KIDNEY DISEASE ON CHRONIC DIALYSIS, UNSPECIFIED WHETHER LONG TERM INSULIN USE: ICD-10-CM

## 2020-01-01 LAB
CFR FLOW - ANTERIOR: 1.43
CFR FLOW - INFERIOR: 1.63
CFR FLOW - LATERAL: 1.21
CFR FLOW - MAX: 2.3
CFR FLOW - MIN: 0.99
CFR FLOW - SEPTAL: 1.69
CFR FLOW - WHOLE HEART: 1.49
CLASS I ANTIBODIES - LUMINEX: NORMAL
CLASS II ANTIBODY COMMENTS - LUMINEX: NORMAL
CPRA %: 0
CV PHARM DOSE: 55.8 MG
CV STRESS BASE HR: 82 BPM
DIASTOLIC BLOOD PRESSURE: 76 MMHG
END DIASTOLIC INDEX-HIGH: 170 ML/M2
END SYSTOLIC INDEX-HIGH: 70 ML/M2
HPRA INTERPRETATION: NORMAL
HPV HR 12 DNA SPEC QL NAA+PROBE: POSITIVE
HPV16 AG SPEC QL: NEGATIVE
HPV18 DNA SPEC QL NAA+PROBE: NEGATIVE
NUC REST DIASTOLIC VOLUME INDEX: 86
NUC REST EJECTION FRACTION: 61
NUC REST SYSTOLIC VOLUME INDEX: 33
NUC STRESS DIASTOLIC VOLUME INDEX: 82
NUC STRESS EJECTION FRACTION: 69 %
NUC STRESS SYSTOLIC VOLUME INDEX: 25
OHS CV CPX 85 PERCENT MAX PREDICTED HEART RATE MALE: 123
OHS CV CPX MAX PREDICTED HEART RATE: 145
OHS CV CPX PATIENT IS FEMALE: 1
OHS CV CPX PATIENT IS MALE: 0
OHS CV CPX PEAK DIASTOLIC BLOOD PRESSURE: 58 MMHG
OHS CV CPX PEAK HEAR RATE: 80 BPM
OHS CV CPX PEAK RATE PRESSURE PRODUCT: NORMAL
OHS CV CPX PEAK SYSTOLIC BLOOD PRESSURE: 148 MMHG
OHS CV CPX PERCENT MAX PREDICTED HEART RATE ACHIEVED: 55
OHS CV CPX RATE PRESSURE PRODUCT PRESENTING: 9512
REST FLOW - ANTERIOR: 1.07 CC/MIN/G
REST FLOW - INFERIOR: 0.84 CC/MIN/G
REST FLOW - LATERAL: 1.09 CC/MIN/G
REST FLOW - MAX: 1.26 CC/MIN/G
REST FLOW - MIN: 0.46 CC/MIN/G
REST FLOW - SEPTAL: 0.87 CC/MIN/G
REST FLOW - WHOLE HEART: 0.97 CC/MIN/G
RETIRED EF AND QEF - SEE NOTES: 51 %
SERUM COLLECTION DT - LUMINEX CLASS I: NORMAL
SERUM COLLECTION DT - LUMINEX CLASS II: NORMAL
SPCL1 TESTING DATE: NORMAL
SPCL2 TESTING DATE: NORMAL
SPCLU TESTING DATE: NORMAL
STRESS FLOW - ANTERIOR: 1.51 CC/MIN/G
STRESS FLOW - INFERIOR: 1.36 CC/MIN/G
STRESS FLOW - LATERAL: 1.32 CC/MIN/G
STRESS FLOW - MAX: 1.82 CC/MIN/G
STRESS FLOW - MIN: 0.67 CC/MIN/G
STRESS FLOW - SEPTAL: 1.47 CC/MIN/G
STRESS FLOW - WHOLE HEART: 1.42 CC/MIN/G
SYSTOLIC BLOOD PRESSURE: 116 MMHG

## 2020-01-01 PROCEDURE — 88175 CYTOPATH C/V AUTO FLUID REDO: CPT | Performed by: PATHOLOGY

## 2020-01-01 PROCEDURE — G0101 CA SCREEN;PELVIC/BREAST EXAM: HCPCS | Mod: S$PBB,,, | Performed by: OBSTETRICS & GYNECOLOGY

## 2020-01-01 PROCEDURE — G0101 CA SCREEN;PELVIC/BREAST EXAM: HCPCS | Mod: PBBFAC | Performed by: OBSTETRICS & GYNECOLOGY

## 2020-01-01 PROCEDURE — 99215 OFFICE O/P EST HI 40 MIN: CPT | Mod: PBBFAC,25,TXP | Performed by: NURSE PRACTITIONER

## 2020-01-01 PROCEDURE — 99999 PR PBB SHADOW E&M-EST. PATIENT-LVL IV: ICD-10-PCS | Mod: PBBFAC,,, | Performed by: OBSTETRICS & GYNECOLOGY

## 2020-01-01 PROCEDURE — 99999 PR PBB SHADOW E&M-EST. PATIENT-LVL V: ICD-10-PCS | Mod: PBBFAC,TXP,, | Performed by: NURSE PRACTITIONER

## 2020-01-01 PROCEDURE — 88141 CYTOPATH C/V INTERPRET: CPT | Mod: ,,, | Performed by: PATHOLOGY

## 2020-01-01 PROCEDURE — 87624 HPV HI-RISK TYP POOLED RSLT: CPT

## 2020-01-01 PROCEDURE — 78492 MYOCRD IMG PET MLT RST&STRS: CPT | Mod: PBBFAC,TXP | Performed by: INTERNAL MEDICINE

## 2020-01-01 PROCEDURE — 93016 CARDIAC PET SCAN STRESS (CUPID ONLY): ICD-10-PCS | Mod: S$PBB,TXP,, | Performed by: INTERNAL MEDICINE

## 2020-01-01 PROCEDURE — G0101 PR CA SCREEN;PELVIC/BREAST EXAM: ICD-10-PCS | Mod: S$PBB,,, | Performed by: OBSTETRICS & GYNECOLOGY

## 2020-01-01 PROCEDURE — 76770 US RETROPERITONEAL COMPLETE: ICD-10-PCS | Mod: 26,TXP,, | Performed by: RADIOLOGY

## 2020-01-01 PROCEDURE — 93016 CV STRESS TEST SUPVJ ONLY: CPT | Mod: S$PBB,TXP,, | Performed by: INTERNAL MEDICINE

## 2020-01-01 PROCEDURE — 99211 OFF/OP EST MAY X REQ PHY/QHP: CPT | Mod: PBBFAC,TXP,25

## 2020-01-01 PROCEDURE — 78492 CARDIAC PET SCAN STRESS (CUPID ONLY): ICD-10-PCS | Mod: 26,S$PBB,TXP, | Performed by: INTERNAL MEDICINE

## 2020-01-01 PROCEDURE — 76770 US EXAM ABDO BACK WALL COMP: CPT | Mod: TC,TXP

## 2020-01-01 PROCEDURE — 99999 PR PBB SHADOW E&M-EST. PATIENT-LVL IV: CPT | Mod: PBBFAC,,, | Performed by: OBSTETRICS & GYNECOLOGY

## 2020-01-01 PROCEDURE — 99999 PR PBB SHADOW E&M-EST. PATIENT-LVL I: CPT | Mod: PBBFAC,TXP,,

## 2020-01-01 PROCEDURE — 93018 CV STRESS TEST I&R ONLY: CPT | Mod: S$PBB,TXP,, | Performed by: INTERNAL MEDICINE

## 2020-01-01 PROCEDURE — 76770 US EXAM ABDO BACK WALL COMP: CPT | Mod: 26,TXP,, | Performed by: RADIOLOGY

## 2020-01-01 PROCEDURE — 88141 PR  CYTOPATH CERV/VAG INTERPRET: ICD-10-PCS | Mod: ,,, | Performed by: PATHOLOGY

## 2020-01-01 PROCEDURE — 99214 OFFICE O/P EST MOD 30 MIN: CPT | Mod: PBBFAC,25 | Performed by: OBSTETRICS & GYNECOLOGY

## 2020-01-01 PROCEDURE — 99215 PR OFFICE/OUTPT VISIT, EST, LEVL V, 40-54 MIN: ICD-10-PCS | Mod: S$PBB,TXP,, | Performed by: NURSE PRACTITIONER

## 2020-01-01 PROCEDURE — 99215 OFFICE O/P EST HI 40 MIN: CPT | Mod: PBBFAC,25,27,TXP | Performed by: NURSE PRACTITIONER

## 2020-01-01 PROCEDURE — 93018 CARDIAC PET SCAN STRESS (CUPID ONLY): ICD-10-PCS | Mod: S$PBB,TXP,, | Performed by: INTERNAL MEDICINE

## 2020-01-01 PROCEDURE — 99215 OFFICE O/P EST HI 40 MIN: CPT | Mod: S$PBB,TXP,, | Performed by: NURSE PRACTITIONER

## 2020-01-01 PROCEDURE — 99999 PR PBB SHADOW E&M-EST. PATIENT-LVL V: CPT | Mod: PBBFAC,TXP,, | Performed by: NURSE PRACTITIONER

## 2020-01-01 PROCEDURE — 99999 PR PBB SHADOW E&M-EST. PATIENT-LVL I: ICD-10-PCS | Mod: PBBFAC,TXP,,

## 2020-01-01 RX ORDER — SEMAGLUTIDE 1.34 MG/ML
INJECTION, SOLUTION SUBCUTANEOUS
COMMUNITY
Start: 2020-01-01

## 2020-01-01 RX ORDER — LORATADINE 10 MG/1
1 TABLET ORAL
COMMUNITY
Start: 2020-01-01

## 2020-01-01 RX ORDER — SODIUM BICARBONATE 650 MG/1
650 TABLET ORAL 2 TIMES DAILY
COMMUNITY
Start: 2020-01-01

## 2020-01-01 RX ORDER — PEN NEEDLE, DIABETIC 31 GX5/16"
NEEDLE, DISPOSABLE MISCELLANEOUS
COMMUNITY
Start: 2020-01-01

## 2020-01-01 RX ORDER — HEPARIN SODIUM 1000 [USP'U]/ML
INJECTION, SOLUTION INTRAVENOUS; SUBCUTANEOUS
COMMUNITY
Start: 2020-01-01 | End: 2021-06-11

## 2020-01-01 RX ORDER — METOLAZONE 2.5 MG/1
TABLET ORAL
COMMUNITY
Start: 2020-01-01

## 2020-01-01 RX ORDER — CLINDAMYCIN HYDROCHLORIDE 150 MG/1
150 CAPSULE ORAL 2 TIMES DAILY
COMMUNITY
Start: 2020-01-01

## 2020-01-01 RX ORDER — CYPROHEPTADINE HYDROCHLORIDE 4 MG/1
4 TABLET ORAL 2 TIMES DAILY
COMMUNITY
Start: 2020-01-01 | End: 2020-01-01 | Stop reason: ALTCHOICE

## 2020-01-01 RX ORDER — MEGESTROL ACETATE 40 MG/1
40 TABLET ORAL 2 TIMES DAILY
COMMUNITY
Start: 2020-01-01

## 2020-01-01 RX ORDER — IRON POLYSACCHARIDE COMPLEX 150 MG
1 CAPSULE ORAL DAILY
COMMUNITY
Start: 2020-04-04 | End: 2020-01-01

## 2020-01-01 RX ORDER — SULFAMETHOXAZOLE AND TRIMETHOPRIM 800; 160 MG/1; MG/1
1 TABLET ORAL 2 TIMES DAILY
COMMUNITY
Start: 2020-01-01 | End: 2020-01-01 | Stop reason: ALTCHOICE

## 2020-01-01 RX ORDER — POLYETHYLENE GLYCOL 3350 17 G/17G
POWDER, FOR SOLUTION ORAL
COMMUNITY
Start: 2020-01-01

## 2020-01-01 RX ORDER — BUTALBITAL, ACETAMINOPHEN AND CAFFEINE 50; 325; 40 MG/1; MG/1; MG/1
1 TABLET ORAL EVERY 4 HOURS PRN
COMMUNITY

## 2020-01-01 RX ORDER — ACETAMINOPHEN AND CODEINE PHOSPHATE 300; 30 MG/1; MG/1
1 TABLET ORAL EVERY 12 HOURS PRN
COMMUNITY
Start: 2020-04-21

## 2020-01-01 RX ORDER — DIPYRIDAMOLE 5 MG/ML
55.75 INJECTION INTRAVENOUS
Status: COMPLETED | OUTPATIENT
Start: 2020-01-01 | End: 2020-01-01

## 2020-01-01 RX ADMIN — DIPYRIDAMOLE 55.75 MG: 5 INJECTION INTRAVENOUS at 09:06

## 2020-02-04 PROBLEM — N18.6 ESRD (END STAGE RENAL DISEASE): Status: ACTIVE | Noted: 2020-02-04

## 2020-02-04 PROBLEM — I10 MALIGNANT HYPERTENSION: Status: ACTIVE | Noted: 2020-02-04

## 2020-02-04 PROBLEM — E11.9 DM TYPE 2 (DIABETES MELLITUS, TYPE 2): Status: ACTIVE | Noted: 2019-09-19

## 2020-02-05 PROBLEM — I25.10 CAD (CORONARY ARTERY DISEASE): Status: ACTIVE | Noted: 2020-02-05

## 2020-02-28 ENCOUNTER — COMMITTEE REVIEW (OUTPATIENT)
Dept: TRANSPLANT | Facility: CLINIC | Age: 70
End: 2020-02-28

## 2020-02-28 DIAGNOSIS — Z76.82 ORGAN TRANSPLANT CANDIDATE: Primary | ICD-10-CM

## 2020-02-28 NOTE — LETTER
February 28, 2020    Dane Varghese  [address]  [facility] [facility] [address]     Dear Dr. Varghese:    Patient: Latia Swain   MR Number: [id number]   YOB: 1950     Your patient, Latia Swain, was recently discussed at the [facility] Selection Committee meeting on 2/28/2020. I am happy to inform you that Latia has been approved for transplantation.  She has met selection criteria for a kidney transplant related to ESRD secondary to primary diagnosis of Diabetes Mellitus - Type II. Your patient will be placed on the cadaveric wait list pending final financial approval from insurance company.     We appreciate your confidence in allowing us to participate in your patients care.  If you have any questions or concerns, please do not hesitate to contact me.    Sincerely,    Kassy Mariee MD  Medical Director, Kidney & Kidney/Pancreas Transplantation    Cc: [facility]  tj/Enclosure

## 2020-02-28 NOTE — COMMITTEE REVIEW
Native Organ Dx: Diabetes Mellitus - Type II      SELECTION COMMITTEE NOTE    Latia Swain was presented at selection committee on 2/28/2020.  Patient met selection criteria for kidney transplant related to ESRD due to  Diabetes Mellitus - Type II.  No absolute contraindications to transplant at this time.  Patient will be placed on the cadaveric wait list pending final financial approval from insurance company.  Patient will return to clinic for routine appointment in 6 month(s). Patient does not meet criteria for High KDPI kidney offer due to weight. Patient meets HCV+ kidney offer. Patient does not meet criteria for dual/enbloc, due to weight.    Patient will need yearly stress test and CT of Abd/pelvis every 2 years while on the wait list.          Note written by Katia Andujar RN    ===============================================    I was present at the meeting and attest to the decision of the committee

## 2020-03-09 ENCOUNTER — TELEPHONE (OUTPATIENT)
Dept: TRANSPLANT | Facility: CLINIC | Age: 70
End: 2020-03-09

## 2020-03-09 DIAGNOSIS — Z76.82 ORGAN TRANSPLANT CANDIDATE: Primary | ICD-10-CM

## 2020-03-09 NOTE — TELEPHONE ENCOUNTER
"  KIDNEY WAIT LISTING NOTE    Date of Financial clearance to list: 3/3/20    SSN/[facility]:     Organ: Kidney  Name:       Latia Swain   : 1950          Gender:     female    MRN#: [id number]                                 [place]  [facility] [address]  Ethnicity: /Black   Race:      Black or     CLINICAL INFORMATION   Candidate Medical Urgency Status: Active  Number of Previous Kidney Transplants: 0  Number of Previous Solid Organ Transplants: 0  Did you enter number of previous kidney or other solid organ transplants? yes  Is this Candidate a Prior Living Donor: no  (If yes, please generate letter to [employer] with patient's date of donation, recipient SSN, signed by Surgical Director after patient is listed in order to receive priority points).      ABO  ABO Blood Group:   B POS     ABO Confirmation: (THESE DATES MUST BE PRIOR TO THE LIST DATE AND SUPPORTED BY SEPARATE LAB REPORTS)    Internal Results    Lab Results   Component Value Date    GROUPTR B POS 10/14/2019    GROUPTR B POS 2019     No results found for: ABO    External Results    ABO Date 1:    ABO Date 2  Are either of these ABO results based on External Labs? no  (If Yes, STOP and go to source document in Media Tab for verification).    VITALS  Height:  5' 5"  Weight:  209 lbs  (Use height from Transplant clinic visits only).  Did you enter height/weight? Yes    HLA    Class I:  Lab Results   Component Value Date    HHKG7YV 2 2019    PVPB2LI 68 2019    YRLM0YX 72 2019    CHHH3VA 58 2019    BMOQO6FK 6 2019    VJVGI4CY 4 2019    WLOSA3XW 2 2019    QRUHP0MF 6 2019       Class II:  Lab Results   Component Value Date    XJLTTM10QC 7 2019    YGIPKU10CH 15 2019    EHOTUF385DO 53 2019    AVDTOQ5059 51 2019    AKNZH8LK 2 2019    BPONB2DZ 6 2019       Tested for HLA Antibodies: Yes, antibodies " "detected     If result is "Positive" antibodies are detected     If result is "Negative or questionable" no antibodies detected    Lab Results   Component Value Date    CIPRAS Negative 09/19/2019    CIIPRAS Positive 09/19/2019       DIALYSIS INFORMATION  Is patient Pre-Dialysis: No     GFR Information  Report GFR being used as the criteria for placement on the kidney list. If not, leave blank  GFR < or = 20 ml/min? n/a  If Yes, Specify value  ___   ml/min     Initial date GFR became 20 or less:   Is GFR obtained from an Outside lab Result? n/a  (If YES verify with source document scanned into media)    If patient on Dialysis:    Is candidate currently on dialysis for ESRD? Yes  If Yes,  Date Chronic Dialysis Started:   1/13/2020  (verify with source document in Media Tab)   Dialysis Unit Name: [place] [address] [facility] [address]  [facility] [address]                        Physician Name:  Dr. Kassy Alexander  NPI#: [id number]    DIABETES INFORMATION  Primary Native Kidney Diagnosis: Diabetes Mellitus - Type II  C-Peptide Value - No results found for: CPEPTIDE  Current Diabetes Status: Type II    FOR NON-KIDNEY DEPARTMENT USE ONLY:  Additional Organs Registered? none    Maximum Acceptable Number of HLA Mismatches  ABDR:     6      (0-6)               AB:               (0-4)  ADR:   _____  (0-4)              BDR: _____ (0-4)  A:        _____  (0-2)              B:      _____ (0-2)          DR: ______ (0-2)    Will Recipient Accept?   Accept HBcAB Positive Organ:            Yes  Accept HBV DARON Positive Organ:        no  Accept HCV Antibody Positive Organ: yes   Accept HCV DARON Positive Organ: yes    Dual Kidney and En Bloc Opt In : No  Dual  Local:   No  Dual Import:   No  En Bloc Local:   No  En Bloc Import: No     Accept KDPI > 85: Single: No     Local: No     Import: No  Accept KDPI > 85: Dual: No     Local: No     Import: No      ### NURSE TO VERIFY CONSENT AND MAKE ANY NECESSARY CHANGES NEEDED IN UNET " AT THE TIME OF VERIFICATION ###    Unacceptible Antigens  If yes, list     Lab Results   Component Value Date    QR8JHFJ B76 09/19/2019    CIIAB Negative 09/19/2019       ### DO NOT LIST IF ANTIGEN VALUE WEAK ###

## 2020-03-09 NOTE — LETTER
2020  Latia Swain  [place] [address]    Dear Latia Wiliam:  MRN: [id number]    Congratulations! On 3/9/2020, you were placed on  the waiting list for a  donor transplant.    Your candidacy for kidney transplant is based on the following criteria: ESRD due to Diabetes Mellitus - Type II.    Your transplant coordinator while on the waiting list is Nicole Rodríguez RN. They can be reached at [phone] or [phone] with any questions.      What to do now?    ? Ask your living donors to call and begin testing   Give your donors our phone number, [phone]   Make sure your donors have your name and date of birth when they call   You will get transplanted much faster if you have a living donor    ? Have your blood sent to our Transplant Lab every month   If you are on dialysis - our Transplant Lab will work with your dialysis unit to send your blood every month   If you are not on dialysis   - If you live near an [facility] lab, we will schedule you to have blood drawn every month  - If you do not live near an [facility] lab, you will be sent blood kits in the mail. You will need to take a kit to your local lab or doctor to have your blood drawn every month and mail to the Transplant Lab.     ? Call us with ANY CHANGES   Phone numbers - we must be able to reach you anytime of the day or night when a kidney is available   Address   Insurance coverage   Dialysis unit or kidney doctor   [place]: if you have surgery, stay in the hospital, have to get blood, or have an infection    ? Review your Kidney/Kidney-Pancreas Transplant Guide    This will give you detailed information about what happens when  - you are on the waiting list   - you are called when a kidney is available     The [facility] has a transplant surgeon and physician available 365  days a year, 24 hours a day to coordinate organ acceptance, procurement, surgical placement and to   address urgent patient care issues.  You will be notified in writing of any changes to our Transplant  Centers staffing plan that would impact your ability to receive a transplant.     Attached is a letter from the [employer] for Organ Sharing (UNOS). It describes the services and  information offered to patients by UN[employer] and the Organ Procurement and Transplant Network. We look  forward to working with you while on the waiting list.      Congratulations,   Your Transplant Partner   [facility]    [facility] [place] [address]   [phone]  tj/Enclosure         The Organ Procurement and Transplantation Network   Toll-free patient services line: Your resource for organ transplant information     If you have a question regarding your own medical care, you always should call your transplant hospital first. However, for general organ transplant-related information, you can call the Organ Procurement and Transplantation Network (OPTN) toll-free patient services line at [phone].     Anyone, including potential transplant candidates, candidates, recipients, family members, friends, living donors, and donor family members, can call this number to:     · Talk about organ donation, living donation, the transplant process, the donation process, and transplant policies.   · Get a free patient information kit with helpful booklets, waiting list and transplant information, and a list of all transplant hospitals.   · Ask questions about the OPTN website (https://optn.transplant.hrsa.gov/), the [employer] for Organ Sharings (UNOS) website (https://unos.org/), or the UNOS website for living donors and transplant recipients. (https://www.transplantliving.org/).   · Learn how the OPTN can help you.   · Talk about any concerns that you may have with a transplant hospital.     The nations transplant system, the OPTN, is managed under federal contract by the [employer]  for Organ Sharing (UNOS), which is a non-profit charitable organization. The OPTN helps create and define organ sharing policies that make the best use of donated organs. This process continuously evaluating new advances and discoveries so policies can be adapted to best serve patients waiting for transplants. To do so, the OPTN works closely with transplant professionals, transplant patients, transplant candidates, donor families, living donors, and the public. All transplant programs and organ procurement organizations throughout the country are OPTN members and are obligated to follow the policies the OPTN creates for allocating organs.     The OPTN also is responsible for:   · Providing educational material for patients, the public, and professionals.   · Raising awareness of the need for donated organs and tissue.   · Coordinating organ procurement, matching, and placement.   · Collecting information about every organ transplant and donation that occurs in the [place].     Remember, you should contact your transplant hospital directly if you have questions or concerns about your own medical care including medical records, work-up progress, and test results.     We are not your transplant hospital, and our staff will not be able to answer questions about your case, so please keep your transplant hospitals phone number handy.   However, while you research your transplant needs and learn as much as you can about transplantation and donation, we welcome your call to our toll-free patient services line at [phone].

## 2020-03-10 DIAGNOSIS — Z76.82 ORGAN TRANSPLANT CANDIDATE: Primary | ICD-10-CM

## 2020-03-10 DIAGNOSIS — Z76.82 ORGAN TRANSPLANT CANDIDATE: ICD-10-CM

## 2020-03-10 DIAGNOSIS — Z76.82 PRE-KIDNEY TRANSPLANT, LISTED: Primary | ICD-10-CM

## 2020-03-10 NOTE — PROGRESS NOTES
YEARLY LIST MANAGEMENT NOTE    Latia Swain's kidney transplant listing status reviewed.  Patient is due for follow-up appointments on 3/19/20.  Appointments will be scheduled per protocol.

## 2020-03-19 ENCOUNTER — TELEPHONE (OUTPATIENT)
Dept: TRANSPLANT | Facility: CLINIC | Age: 70
End: 2020-03-19

## 2020-04-16 PROCEDURE — 86833 HLA CLASS II HIGH DEFIN QUAL: CPT | Mod: PO,TXP

## 2020-04-16 PROCEDURE — 86832 HLA CLASS I HIGH DEFIN QUAL: CPT | Mod: PO,TXP

## 2020-04-20 ENCOUNTER — LAB VISIT (OUTPATIENT)
Dept: LAB | Facility: HOSPITAL | Age: 70
End: 2020-04-20
Payer: MEDICARE

## 2020-04-20 DIAGNOSIS — Z76.82 PRE-KIDNEY TRANSPLANT, LISTED: ICD-10-CM

## 2020-06-18 PROBLEM — Z76.82 PATIENT ON WAITING LIST FOR KIDNEY TRANSPLANT: Status: ACTIVE | Noted: 2020-01-01

## 2020-06-18 NOTE — LETTER
June 19, 2020        Dane Varghese  [address]  [address]  [place] [address]  Phone: [phone]  Fax: [phone]             [address]  [address]  [facility] [address]  Phone: [phone]   Patient: Latia Swain   MR Number: [id number]   YOB: 1950   Date of Visit: 6/18/2020       Dear Dr. Dane Varghese    Thank you for referring Latia Swain to me for evaluation. Attached you will find relevant portions of my assessment and plan of care.    If you have questions, please do not hesitate to call me. I look forward to following Latia Swain along with you.    Sincerely,    Nisha Thomson, NP    Enclosure    If you would like to receive this communication electronically, please contact [email or web address] or [phone] to request [employer] access.    [employer] is a tool which provides read-only access to select patient information with whom you have a relationship. Its easy to use and provides real time access to review your patients record including encounter summaries, notes, results, and demographic information.    If you feel you have received this communication in error or would no longer like to receive these types of communications, please e-mail [email or web address]

## 2020-06-18 NOTE — LETTER
Date: 6/23/2020          Listed Patient      To: Dialysis Unit  and Charge RN From: Chica Ramsey, MSW, LMSW      RE: Latia Swain, 1950, [id number]     At [facility], we conduct adherence checks as an important part of transplant care. Initial and listed patient assessments are not complete without adherence information.        Please complete the following information:     Current Dry Weight: ___________         Most Recent Pre-Treatment Weight: ___________ /date: _________                    Data from the last 3 months:  (data from last 3 months preferred):    Number of AMAs with dates, time, and reasons: ____________________________________________________    ______________________________________________________________________________________________    ______________________________________________________________________________________________    Number of No-Shows with dates and reasons: ______________________________________________________      ______________________________________________________________________________________________    Last intact PTH:  ___________/date: __________      Any concerns with Labs:  YES / NO      If yes, please explain:  ___________________________________________________________________________    ______________________________________________________________________________________________    Any concerns with Caregivers:  YES / NO    If yes, please explain:  ___________________________________________________________________________    ______________________________________________________________________________________________     Any concerns with Transportation:  YES / NO    If yes, please explain:  ___________________________________________________________________________    ______________________________________________________________________________________________    Any Psychiatric and/or Psychosocial  concerns:  YES / NO     If yes, please explain: ___________________________________________________________________________    ______________________________________________________________________________________________      PLEASE RETURN TO: FAX: [phone]     Thank you for collaborating with us in the care of this patient.           [address]  ?  [place] [address]  ?  phone [phone]  ?  fax [phone]  ?  [email or web address]  Confidentiality notice: The accompanying facsimile is intended solely for the use of the recipient designated above. Document(s) transmitted herewith may contain information that is confidential and privileged. Delivery, distribution or dissemination of this communication other than to the intended recipient is strictly prohibited. If you have received this facsimile in error, please notify us immediately by telephone.

## 2020-06-18 NOTE — PROGRESS NOTES
Kidney Transplant Recipient Reevalulation    Referring Physician: Dane Varghese  Current Nephrologist: Dane Varghese  Waitlist Status: active  Dialysis Start Date: 1/13/2020    Subjective:     CC:  Six month reassessment of kidney transplant candidacy.    HPI:  Ms. Swain is a 70 y.o. year old Black or  female with ESRD secondary to diabetic nephropathy.  She has been on the wait list for a kidney transplant at [facility] since 1/13/2020. Patient is currently on peritoneal dialysis started on 2/15/2020. Patient is dialyzing on cyclic peritoneal dialysis.  Patient reports that she is tolerating dialysis well. . She has a PD catheter. Patient denies any recent hospitalizations or ED visits.    BP has typically have been running in 140/60s. But since recent hospitalization in Dec 2019 for CHF requiring dialysis her BP medications has been adjusted. Patient was intubated during this hospitalization.  Today this AM BP was 130/60 but has decreased to 95/54 which is abnormal for her. Took BP medication this AM and at lunch. Dizziness stopped once she ate after stress test. Blood in 200s everyday and will be going to PCP for adjustments in insulin next Thursday.  Patient reports feeling tired, weak, and not very active recently.     Functional Status: She does not exercise. She only walks in the house does laundry and cooks. She does have stairs to climb without any symptoms of chest pain, SOB, claudication.   Previous Transplant: no  Previous Blood Transfusion: 1  Previous Pregnancy: 6  Previous neurogenic bladder/ urine incontinence: no  Anticoagulation/ antiplatelet therapy and reason: plavix and ASA for MI 2008 without stent  Potential Donor: daughter will contact office  [facility] candidate: no  Meets center eligibility for accepting HCV+ donor offer: yes      Recent Work UP  CXR: unremarkable 2/4/2020  Echo: EF 65%  12/18/2020  Cardiac NM: unremarkable 6/18/2020  Maddie US: bilateral simple cysts,  no masses  9/19/2020  Mammogram: benign calcifications no malignancy f/u in 1 yr  10/30/2019  Pap smear:  negative for malignancy  11/2019  Colonoscopy: unremarkable with repeat in 10 years  11/11/2019      Review of Systems   Constitutional: Positive for fatigue. Negative for appetite change, chills and fever.   HENT: Negative for trouble swallowing.    Respiratory: Negative for cough, chest tightness, shortness of breath and wheezing.    Cardiovascular: Negative for chest pain, palpitations and leg swelling.   Gastrointestinal: Positive for constipation. Negative for abdominal pain, diarrhea and nausea.   Genitourinary: Negative for difficulty urinating, frequency and urgency.        PD catheter still urinating 3x a day   Musculoskeletal: Negative for arthralgias and myalgias.   Skin: Negative for rash.   Neurological: Positive for weakness. Negative for dizziness, light-headedness and headaches.   Psychiatric/Behavioral: Negative for sleep disturbance.       Objective:   body mass index is 35.41 kg/m².    Physical Exam  Constitutional:       General: She is not in acute distress.     Appearance: She is well-developed. She is not diaphoretic.   Cardiovascular:      Rate and Rhythm: Normal rate and regular rhythm.      Heart sounds: Normal heart sounds. No murmur. No friction rub. No gallop.    Pulmonary:      Effort: Pulmonary effort is normal. No respiratory distress.      Breath sounds: Normal breath sounds. No wheezing or rales.   Abdominal:      General: Bowel sounds are normal. There is no distension.      Palpations: Abdomen is soft.      Tenderness: There is no abdominal tenderness.      Comments: PD catheter to RLQ   Musculoskeletal: Normal range of motion.         General: No tenderness.   Skin:     General: Skin is warm and dry.      Findings: No rash.      Nails: There is no clubbing.     Neurological:      Mental Status: She is alert and oriented to person, place, and time.   Psychiatric:          Behavior: Behavior normal.         Labs:  Lab Results   Component Value Date    WBC 9.40 02/04/2020    HGB 10.6 (L) 02/04/2020    HCT 33.4 (L) 02/04/2020    NA SEE COMMENT 02/05/2020     (L) 02/05/2020    K SEE COMMENT 02/05/2020    K 4.1 02/05/2020    CL SEE COMMENT 02/05/2020    CL 95 02/05/2020    CO2 SEE COMMENT 02/05/2020    CO2 28 02/05/2020    BUN SEE COMMENT 02/05/2020    BUN 41 (H) 02/05/2020    CREATININE SEE COMMENT 02/05/2020    CREATININE 4.20 (H) 02/05/2020    EGFRNONAA SEE COMMENT 02/05/2020    EGFRNONAA 10 (A) 02/05/2020    CALCIUM SEE COMMENT 02/05/2020    CALCIUM 9.7 02/05/2020    PHOS SEE COMMENT 02/05/2020    PHOS 4.40 02/05/2020    ALBUMIN SEE COMMENT 02/05/2020    ALBUMIN 3.4 (L) 02/05/2020    AST SEE COMMENT 02/05/2020    AST 25 02/05/2020    ALT SEE COMMENT 02/05/2020    ALT 12 02/05/2020    UHRB315GJ5 23 12/20/2019    UTPCR 2.54 (H) 12/20/2019    .6 (H) 02/05/2020           Lab Results   Component Value Date    BILIRUBINUA Negative 12/18/2019    PROTEINUA 2+ (A) 12/18/2019    NITRITE Negative 12/18/2019    RBCUA 44 (H) 12/18/2019    WBCUA 2 12/18/2019       No results found for: HLAABCTYPE    Lab Results   Component Value Date    CPRA 0 04/16/2020    CPRA 0 04/16/2020    CPRA 0 04/16/2020    NP2XDNN B76 04/16/2020    CIIAB Negative 03/05/2020    ABCMT WEAK---DR14 04/16/2020       Labs were reviewed with the patient.    Pre-transplant Workup:   Reviewed with the patient.    Assessment:     1. Patient on waiting list for kidney transplant    2. Mixed hyperlipidemia    3. History of CHF (congestive heart failure)    4. Malignant hypertension    5. CKD (chronic kidney disease), stage V    6. Chronic kidney disease-mineral and bone disorder    7. ESRD (end stage renal disease)    8. Anemia due to chronic kidney disease, unspecified CKD stage    9. Obesity (BMI 30-39.9)        Plan:   Obtains most recent records from dialysis center; hemoglobin and albumin---  tired, weak, and not  very active recently  Cardiologist clearance letter for transplantation. Seen Cardiologist last month.    Transplant Candidacy:   Ms. Swain is a high-risk kidney transplant candidate. DM, HTN, CAD (MI 2008)  Meets center eligibility for accepting HCV+ donor offer - yes.  Patient educated on HCV+ donors. Latia is willing  to accept HCV+ donor offer -  yes   Patient is a candidate for KDPI > 85 kidney donor offer - no. Due to weight  She remains in overall stable health, and will remain active on the transplant list.    Nisha Thomson NP       Kidney allocation scheme was also discussed with the patient. Kidney donor profile index (KPDI) and estimated post-transplant survival scores (EPTS) were reviewed. The benefits and risks of accepting a kidney with KDPI > 85% were explained to the patient. Patient verbalized understanding and consented to accept a kidney with KDPI > 85%. She does not have any living donors at present.    Exercise: reminded the patient of the importance of regular exercise for weight management, blood sugar and blood pressure management.  I also explained exercise has been shown to improve cardiovascular health, energy level, and sleep hygiene.       Encouraged her to have any potential living donors contact the living donor coordinator.          Follow up:  In addition to the tests mentioned above, the patient will continue to have reevaluation as per the standing pre-kidney transplant protocol:   1. Monthly blood for PRA   2. Annual return to Clinic, except HIV Positive, > 65 years of age, or pancreas transplant candidates who will be scheduled to see transplant every 6 months while in pre-transpalnt phase.   3. Annual re-testing: CXR, EKG, mammograms for women over 40 and PSA for males over 40. cardiology follow-up as recommended by initial cardiology pre-transplant evaluation.   4. Renal Ultrasound every 2 years.   5. Baseline colonoscopy after age 50 and repeated as recommended.          Counselling:  We discussed various aspects of kidney transplantation including transplant surgery, immunosuppressive medications and the need for close follow up. We also discussed side effects of immunosuppression including weight gain, hypertension, hyperlipidemia, new-onset diabetes after transplantation, infections and malignancies, especially skin cancers and lymphomas. I also reviewed the risk of acute rejection, vascular thrombosis, recurrent disease and potential transmission of infections such as hepatitis and HIV. I informed the patient that the average time on the wait list in the [facility] is between 3 to 5 years.         [employer] Patient Status  Functional Status: 60% - Requires occasional assistance but is able to care for needs  Physical Capacity: No Limitations

## 2020-06-19 NOTE — PROGRESS NOTES
YEARLY LIST MANAGEMENT NOTE    Latia Swain's kidney transplant listing status reviewed.  Patient is due for follow-up appointments on 12/18/20.  Appointments will be scheduled per protocol.

## 2020-06-19 NOTE — PROGRESS NOTES
INITIAL PATIENT EDUCATION NOTE    Ms. Latia Swain was seen in pre-kidney transplant clinic for evaluation for kidney, kidney/pancreas or pancreas only transplant.  The patient attended a group education session that discussed/reviewed the following aspects of transplantation: evaluation and selection committee process, UNOS waitlist management/multiple listings, types of organs offered (KDPI < 85%, KDPI > 85%, PHS increased risk, DCD, HCV+, HIV+ for HIV+ recipients and enbloc/dual), financial aspects, surgical procedures, dietary instruction pre- and post-transplant, health maintenance pre- and post-transplant, post-transplant hospitalization and outpatient follow-up, potential to participate in a research protocol, and medication management and side effects.  A question and answer session was provided after the presentation.    The patient was seen by all members of the multi-disciplinary team to include: Nephrologist/PA, Surgeon, , Transplant Coordinator, , Pharmacist and Dietician (if applicable).    The patient reviewed and signed all consents for evaluation which were witnessed and sent to scanning into the [facility] chart.    The patient was given an education book and plan for further evaluation based on her individual assessment.      The patient was encouraged to call with any questions or concerns.
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
## 2020-06-25 NOTE — PROGRESS NOTES
"According to dialysis unit medical record, in the last three months pt has, 0 AMAs, 0  No Shows and denies issues with transportation and labs. "Pt's last PTH was 657 on 6/11/2020".       Confirmed by dialysis unit-Dialysis RN or LMSW           "

## 2020-07-27 PROBLEM — I95.9 HYPOTENSION: Status: ACTIVE | Noted: 2020-01-01
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
## 2020-12-15 NOTE — LETTER
December 16, 2020        Dane Varghese  [address]  [address]  [place] [address]  Phone: [phone]  Fax: [phone]             [facility]- Transplant 1st Fl  [address]  [facility] [address]  Phone: [phone]   Patient: Latia Swain   MR Number: [id number]   YOB: 1950   Date of Visit: 12/15/2020       Dear Dr. Dane Varghese    Thank you for referring Latia Swain to me for evaluation. Attached you will find relevant portions of my assessment and plan of care.    If you have questions, please do not hesitate to call me. I look forward to following Latia Swain along with you.    Sincerely,    Nisha Thomson, NP    Enclosure    If you would like to receive this communication electronically, please contact [email or web address] or [phone] to request [employer] access.    [employer] Link is a tool which provides read-only access to select patient information with whom you have a relationship. Its easy to use and provides real time access to review your patients record including encounter summaries, notes, results, and demographic information.    If you feel you have received this communication in error or would no longer like to receive these types of communications, please e-mail [email or web address]

## 2020-12-15 NOTE — PROGRESS NOTES
Kidney Transplant Recipient Reevalulation    Referring Physician: Dane Varghese  Current Nephrologist: Dane Varghese  Waitlist Status: active  Dialysis Start Date: 1/13/2020    Subjective:     CC:  Six month reassessment of kidney transplant candidacy.    HPI:  Ms. Swain is a 70 y.o. year old Black or  female with ESRD secondary to diabetic nephropathy.  She has been on the wait list for a kidney transplant at [facility] since 1/13/2020. Patient is currently on peritoneal dialysis started on 2/15/2020. Patient is dialyzing on cyclic peritoneal dialysis.  Patient reports that she is tolerating dialysis well. . She has a PD catheter. Patient denies any recent hospitalizations or ED visits.    Hospitalization:  none    Interval History:  No peritonitis. Patient reports that her tired and fatigued has improved since last visit. Patient reports pain to right hip for the last 2 weeks. Denies fall or hitting the hip on anything. She did present in the hospital wheelchair since she was unable to ambulate the long distance. Inside her home she walks unassisted. Patient did require guidance to the exam table. Patient was able to get on and off the table and provided position changes unassisted as asked. 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 Her sister requires assistance with bathing. Patient does climb a flight a steps daily at her home (even with this hip pain). Patient reports she still does some cooking and laundry.     Recent Work UP  CXR: unremarkable 7/27/20  Echo: EF 65%  12/18/2019--due  Cardiac NM: unremarkable 6/18/2020  [facility] US: bilateral simple cysts, no masses  12/15/20  Mammogram: benign calcifications no malignancy f/u in 1 yr  10/30/2019; pending new MMG  Pap smear:  12/2/20 PAP pending, was HPV + awaiting pap results to determine management  Colonoscopy: unremarkable with repeat in 10 years  11/11/2019  Lab Results   Component Value Date    .6 (H) 02/05/2020  "   CALCIUM 9.6 07/27/2020    PHOS 6.70 (H) 07/27/2020           Current Outpatient Medications:     acetaminophen-codeine 300-30mg (TYLENOL #3) 300-30 mg Tab, Take 1 tablet by mouth every 12 (twelve) hours as needed., Disp: , Rfl:     aspirin 81 MG Chew, Take 81 mg by mouth every evening. , Disp: , Rfl:     atorvastatin (LIPITOR) 80 MG tablet, Take 80 mg by mouth once daily., Disp: , Rfl:     BD ULTRA-FINE VALERIE PEN NEEDLE 32 gauge x 5/32" Ndle, USE 1 PEN NEEDLE TWICE DAILY, Disp: , Rfl:     butalbital-acetaminophen-caffeine -40 mg (FIORICET, ESGIC) -40 mg per tablet, Take 1 tablet by mouth every 4 (four) hours as needed for Pain., Disp: , Rfl:     calcitRIOL (ROCALTROL) 0.25 MCG Cap, Take 0.75 mcg by mouth once daily. , Disp: , Rfl:     carvedilol (COREG) 25 MG tablet, Take 25 mg by mouth 2 (two) times daily with meals., Disp: , Rfl:     cholecalciferol, vitamin D3, (VITAMIN D3) 2,000 unit Cap, Take 2,000 Units by mouth once daily. , Disp: , Rfl:     clindamycin (CLEOCIN) 150 MG capsule, Take 150 mg by mouth 2 (two) times daily., Disp: , Rfl:     cloNIDine (CATAPRES) 0.2 MG tablet, Take 1 tablet (0.2 mg total) by mouth 3 (three) times daily. (Patient taking differently: Take 0.2 mg by mouth 3 (three) times daily as needed. ), Disp: 90 tablet, Rfl: 11    clopidogrel (PLAVIX) 75 mg tablet, Take 75 mg by mouth once daily., Disp: , Rfl:     docusate sodium (COLACE) 100 MG capsule, Take 100 mg by mouth Daily., Disp: , Rfl:     fish oil-omega-3 fatty acids 300-1,000 mg capsule, Take by mouth 2 (two) times daily., Disp: , Rfl:     furosemide (LASIX) 80 MG tablet, Take 80 mg by mouth 2 (two) times daily as needed. , Disp: , Rfl:     heparin sodium,porcine (HEPARIN, PORCINE,) 1,000 unit/mL injection, Inject into the peritoneum. prn, Disp: , Rfl:     hydrALAZINE (APRESOLINE) 25 MG tablet, Take 25 mg by mouth 3 (three) times daily. 50 mg in the morning, 25 mg at noon and at bedtime, Disp: , Rfl:    "  insulin degludec (TRESIBA FLEXTOUCH U-100) 100 unit/mL (3 mL) InPn, Inject 50 Units into the skin 2 (two) times daily. , Disp: , Rfl:     isosorbide dinitrate (ISORDIL) 30 MG Tab, Take 1 tablet (30 mg total) by mouth 2 (two) times daily., Disp: 60 tablet, Rfl: 11    loratadine (CLARITIN) 10 mg tablet, Take 1 tablet by mouth., Disp: , Rfl:     losartan (COZAAR) 100 MG tablet, Take 100 mg by mouth once daily., Disp: , Rfl:     megestroL (MEGACE) 40 MG Tab, Take 40 mg by mouth 2 (two) times daily., Disp: , Rfl:     metOLazone (ZAROXOLYN) 2.5 MG tablet, TAKE 1 TABLET BY MOUTH IN THE MORNING 30 MINUTES PRIOR TO LASIX, Disp: , Rfl:     OZEMPIC 0.25 mg or 0.5 mg(2 mg/1.5 mL) PnIj, INJECT 0.5 MG UNDER THE SKIN EVERY WEEKLY, Disp: , Rfl:     polyethylene glycol (MIRALAX) 17 gram PwPk, miralax, Disp: , Rfl:     sevelamer carbonate (RENVELA) 800 mg Tab, Take 1,600 mg by mouth 3 (three) times daily with meals. , Disp: , Rfl:     sodium bicarbonate 650 MG tablet, Take 650 mg by mouth 2 (two) times daily., Disp: , Rfl:     Past Medical History:   Diagnosis Date    Acute pulmonary edema     Allergy     Anemia     Anticoagulant long-term use     Cataract     left 2009, right 2010    CHF (congestive heart failure)     CKD stage V 9/19/2019    Controlled type 2 diabetes mellitus with CKD IV using insulin 9/19/2019    Diabetes mellitus, type 2     Disorder of kidney and ureter     chronic kidney disease dx 5/2018    Diverticulitis     Hyperlipidemia     Hypertension     Liver injury     Myocardial infarction     2008    Peritoneal dialysis status          Review of Systems   Constitutional: Positive for fatigue. Negative for appetite change, chills and fever.   HENT: Negative for trouble swallowing.    Respiratory: Negative for cough, chest tightness, shortness of breath and wheezing.    Cardiovascular: Negative for chest pain, palpitations and leg swelling.   Gastrointestinal: Positive for constipation.  "Negative for abdominal pain, diarrhea and nausea.   Genitourinary: Negative for difficulty urinating, frequency and urgency.        PD catheter still urinating 3x a day   Musculoskeletal: Negative for arthralgias and myalgias.   Skin: Negative for rash.   Neurological: Positive for weakness. Negative for dizziness, light-headedness and headaches.   Psychiatric/Behavioral: Negative for sleep disturbance.       Objective:   body mass index is 35.74 kg/m².  BP (!) 121/58 (BP Location: Left arm, Patient Position: Sitting, BP Method: Large (Automatic))   Pulse 88   Temp 96.1 °F (35.6 °C) (Oral)   Resp 18   Ht 5' 4.72" (1.644 m)   Wt 96.6 kg (212 lb 15.4 oz)   LMP 12/02/2003   SpO2 96%   BMI 35.74 kg/m²     Physical Exam  Constitutional:       General: She is not in acute distress.     Appearance: She is well-developed. She is not diaphoretic.   Cardiovascular:      Rate and Rhythm: Normal rate and regular rhythm.      Heart sounds: Normal heart sounds. No murmur. No friction rub. No gallop.    Pulmonary:      Effort: Pulmonary effort is normal. No respiratory distress.      Breath sounds: Normal breath sounds. No wheezing or rales.   Abdominal:      General: Bowel sounds are normal. There is no distension.      Palpations: Abdomen is soft.      Tenderness: There is no abdominal tenderness.      Comments: PD catheter to RLQ   Musculoskeletal: Normal range of motion.         General: No tenderness.   Skin:     General: Skin is warm and dry.      Findings: No rash.      Nails: There is no clubbing.     Neurological:      Mental Status: She is alert and oriented to person, place, and time.   Psychiatric:         Behavior: Behavior normal.         Labs:  Lab Results   Component Value Date    WBC 13.50 (H) 07/27/2020    HGB 11.0 (L) 07/27/2020    HCT 35.0 (L) 07/27/2020     (L) 07/27/2020    K 4.7 07/27/2020    CL 89 (L) 07/27/2020    CO2 29 07/27/2020    BUN 77 (H) 07/27/2020    CREATININE 12.20 (H) 07/27/2020 "    EGFRNONAA 3 (A) 07/27/2020    CALCIUM 9.6 07/27/2020    PHOS 6.70 (H) 07/27/2020    MG 4.0 (H) 07/27/2020    ALBUMIN 4.0 07/27/2020    AST 26 07/27/2020    ALT 12 07/27/2020    KVQN745DO9 23 12/20/2019    UTPCR 2.54 (H) 12/20/2019    .6 (H) 02/05/2020       Lab Results   Component Value Date    BILIRUBINUA Negative 12/18/2019    PROTEINUA 2+ (A) 12/18/2019    NITRITE Negative 12/18/2019    RBCUA 44 (H) 12/18/2019    WBCUA 2 12/18/2019       No results found for: HLAABCTYPE    Lab Results   Component Value Date    CPRA 0 11/11/2020    CPRA 0 11/11/2020    CPRA 0 11/11/2020    HQ4KSHT B76 11/11/2020    CIIAB Negative 11/11/2020    ABCMT WEAK DR14 08/05/2020       Labs were reviewed with the patient.    Pre-transplant Workup:   Reviewed with the patient.    Assessment:     1. Patient on waiting list for kidney transplant    2. ESRD (end stage renal disease)    3. Essential hypertension, benign    4. Coronary artery disease, angina presence unspecified, unspecified vessel or lesion type, unspecified whether native or transplanted heart    5. Anemia due to chronic kidney disease, unspecified CKD stage    6. Type 2 diabetes mellitus with chronic kidney disease on chronic dialysis, unspecified whether long term insulin use    7. Mixed hyperlipidemia    8. History of CHF (congestive heart failure)    9. History of myocardial infarction 2008        Plan:   12/2/20 PAP pending, was HPV + awaiting pap results to determine management  Needs UTD MMG and echo    Patient needs to see PCP for right hip pain and possible physical therapy (patient is still mostly active, see note above)    Patient to undergo 6 min Walk test in March 2020 (gives time to work-up hip pain and if needed PT)    Transplant Candidacy:   Ms. Swain is a high-risk kidney transplant candidate. DM, HTN, CAD (MI 2008)  Meets center eligibility for accepting HCV+ donor offer - yes.  Patient educated on HCV+ donors. Latia is willing  to accept HCV+  donor offer -  yes   Patient is a candidate for KDPI > 85 kidney donor offer - no. Due to weight  She remains in overall stable health, and will remain active on the transplant list.    Nisha Thomson NP            Follow up:  In addition to the tests mentioned above, the patient will continue to have reevaluation as per the standing pre-kidney transplant protocol:   1. Monthly blood for PRA   2. Annual return to Clinic, except HIV Positive, > 65 years of age, or pancreas transplant candidates who will be scheduled to see transplant every 6 months while in pre-transpalnt phase.   3. Annual re-testing: CXR, EKG, mammograms for women over 40 and PSA for males over 40. cardiology follow-up as recommended by initial cardiology pre-transplant evaluation.   4. Renal Ultrasound every 2 years.   5. Baseline colonoscopy after age 50 and repeated as recommended.       Counseling:  Exercise: reminded patient of the importance of regular exercise for weight management, blood sugar and blood pressure management.  I also explained exercise has been shown to improve cardiovascular health, energy level, and sleep hygiene.  Lastly, I advised her that cardiovascular complications are leading cause of death for renal transplant recipients, and regular exercise can help lower this risk.    We discussed various aspects of kidney transplantation including transplant surgery, immunosuppressive medications and the need for close follow up. We also discussed side effects of immunosuppression including weight gain, hypertension, hyperlipidemia, new-onset diabetes after transplantation, infections and malignancies, especially skin cancers and lymphomas. I also reviewed the risk of acute rejection, vascular thrombosis, recurrent disease and potential transmission of infections such as hepatitis and HIV. I informed the patient that the average time on the wait list in the [facility] is between 5 to 7 years.       [facility]OS Patient  Status  Functional Status: 60% - Requires occasional assistance but is able to care for needs  Physical Capacity: No Limitations

## 2020-12-15 NOTE — PROGRESS NOTES
INITIAL PATIENT EDUCATION NOTE    Ms. Latia Swain was seen in pre-kidney transplant clinic for evaluation for kidney, kidney/pancreas or pancreas only transplant.  The patient attended a an individual video education session that discussed/reviewed the following aspects of transplantation: evaluation and selection committee process, [facility] waitlist management/multiple listings, types of organs offered (KDPI < 85%, KDPI > 85%, PHS increased risk, DCD, HCV+, HIV+ for HIV+ recipients and enbloc/dual), financial aspects, surgical procedures, dietary instruction pre- and post-transplant, health maintenance pre- and post-transplant, post-transplant hospitalization and outpatient follow-up, potential to participate in a research protocol, and medication management and side effects.  A question and answer session was provided after the presentation.    The patient was seen by all members of the multi-disciplinary team to include: Nephrologist/PA, Surgeon, , Transplant Coordinator, , Pharmacist and Dietician (if applicable).    The patient reviewed and signed all consents for evaluation which were witnessed and sent to scanning into the [facility] chart.    The patient was given an education book and plan for further evaluation based on her individual assessment.      The patient was encouraged to call with any questions or concerns.

## 2020-12-15 NOTE — LETTER
Date: 12/16/2020          Listed Patient      To: Dialysis Unit  and Charge RN From: Chica Ramsey, MSW, LMSW    RE: Latia Swain, 1950, [id number]     At [facility], we conduct adherence checks as an important part of transplant care. Initial and listed patient assessments are not complete without adherence information.        Please complete the following information:     Current Dry Weight: ___________         Most Recent Pre-Treatment Weight: ___________ /date: _________                    Data from the last 3 months:  (data from last 3 months preferred):    Number of AMAs with dates, time, and reasons: ____________________________________________________    ______________________________________________________________________________________________    ______________________________________________________________________________________________    Number of No-Shows with dates and reasons: ______________________________________________________      ______________________________________________________________________________________________    Last intact PTH:  ___________/date: __________      Any concerns with Labs:  YES / NO      If yes, please explain:  ___________________________________________________________________________    ______________________________________________________________________________________________    Any concerns with Caregivers:  YES / NO    If yes, please explain:  ___________________________________________________________________________    ______________________________________________________________________________________________     Any concerns with Transportation:  YES / NO    If yes, please explain:  ___________________________________________________________________________    ______________________________________________________________________________________________    Any Psychiatric and/or Psychosocial  concerns:  YES / NO     If yes, please explain: ___________________________________________________________________________    ______________________________________________________________________________________________      PLEASE RETURN TO: FAX: [phone]     Thank you for collaborating with us in the care of this patient.           [address]  ?  [place] [address]  ?  phone [phone]  ?  fax [phone]  ?  [email or web address]  Confidentiality notice: The accompanying facsimile is intended solely for the use of the recipient designated above. Document(s) transmitted herewith may contain information that is confidential and privileged. Delivery, distribution or dissemination of this communication other than to the intended recipient is strictly prohibited. If you have received this facsimile in error, please notify us immediately by telephone.
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
## 2021-01-01 ENCOUNTER — PATIENT MESSAGE (OUTPATIENT)
Dept: TRANSPLANT | Facility: CLINIC | Age: 71
End: 2021-01-01

## 2021-01-01 ENCOUNTER — HOSPITAL ENCOUNTER (OUTPATIENT)
Dept: CARDIOLOGY | Facility: HOSPITAL | Age: 71
Discharge: HOME | End: 2021-03-08
Attending: NURSE PRACTITIONER
Payer: MEDICARE

## 2021-01-01 ENCOUNTER — LAB VISIT (OUTPATIENT)
Dept: LAB | Facility: HOSPITAL | Age: 71
End: 2021-01-01
Payer: MEDICARE

## 2021-01-01 ENCOUNTER — HOSPITAL ENCOUNTER (OUTPATIENT)
Dept: PULMONOLOGY | Facility: CLINIC | Age: 71
Discharge: HOME | End: 2021-03-08
Payer: MEDICARE

## 2021-01-01 ENCOUNTER — EPISODE CHANGES (OUTPATIENT)
Dept: TRANSPLANT | Facility: CLINIC | Age: 71
End: 2021-01-01

## 2021-01-01 ENCOUNTER — TELEPHONE (OUTPATIENT)
Dept: TRANSPLANT | Facility: CLINIC | Age: 71
End: 2021-01-01

## 2021-01-01 VITALS
BODY MASS INDEX: 36.7 KG/M2 | HEART RATE: 74 BPM | SYSTOLIC BLOOD PRESSURE: 108 MMHG | WEIGHT: 215 LBS | DIASTOLIC BLOOD PRESSURE: 68 MMHG | HEIGHT: 64 IN

## 2021-01-01 VITALS — WEIGHT: 215 LBS | HEIGHT: 64 IN | BODY MASS INDEX: 36.7 KG/M2

## 2021-01-01 DIAGNOSIS — Z76.82 ORGAN TRANSPLANT CANDIDATE: ICD-10-CM

## 2021-01-01 DIAGNOSIS — Z76.82 PRE-KIDNEY TRANSPLANT, LISTED: ICD-10-CM

## 2021-01-01 LAB
ASCENDING AORTA: 2.89 CM
AV INDEX (PROSTH): 0.72
AV MEAN GRADIENT: 5 MMHG
AV PEAK GRADIENT: 9 MMHG
AV VALVE AREA: 2.16 CM2
AV VELOCITY RATIO: 0.66
BSA FOR ECHO PROCEDURE: 2.1 M2
CLASS I ANTIBODIES - LUMINEX: NORMAL
CLASS II ANTIBODIES - LUMINEX: NEGATIVE
CPRA %: 0
CV ECHO LV RWT: 0.38 CM
DOP CALC AO PEAK VEL: 1.51 M/S
DOP CALC AO VTI: 27.64 CM
DOP CALC LVOT AREA: 3 CM2
DOP CALC LVOT DIAMETER: 1.96 CM
DOP CALC LVOT PEAK VEL: 1 M/S
DOP CALC LVOT STROKE VOLUME: 59.62 CM3
DOP CALCLVOT PEAK VEL VTI: 19.77 CM
ECHO LV POSTERIOR WALL: 0.85 CM (ref 0.6–1.1)
FINAL PATHOLOGIC DIAGNOSIS: NORMAL
FRACTIONAL SHORTENING: 37 % (ref 28–44)
HLA FREEZE AND HOLD INTERPRETATION: NORMAL
HLAFH COLLECTION DATE: NORMAL
HPRA INTERPRETATION: NORMAL
HPRA INTERPRETATION: NORMAL
INTERVENTRICULAR SEPTUM: 1.14 CM (ref 0.6–1.1)
IVRT: 91.34 MSEC
LA MAJOR: 6.59 CM
LA MINOR: 6.42 CM
LA WIDTH: 4.38 CM
LEFT ATRIUM SIZE: 4.41 CM
LEFT ATRIUM VOLUME INDEX MOD: 53 ML/M2
LEFT ATRIUM VOLUME INDEX: 52.9 ML/M2
LEFT ATRIUM VOLUME MOD: 107.1 CM3
LEFT ATRIUM VOLUME: 106.78 CM3
LEFT INTERNAL DIMENSION IN SYSTOLE: 2.8 CM (ref 2.1–4)
LEFT VENTRICLE DIASTOLIC VOLUME INDEX: 44.32 ML/M2
LEFT VENTRICLE DIASTOLIC VOLUME: 89.53 ML
LEFT VENTRICLE MASS INDEX: 74 G/M2
LEFT VENTRICLE SYSTOLIC VOLUME INDEX: 14.6 ML/M2
LEFT VENTRICLE SYSTOLIC VOLUME: 29.49 ML
LEFT VENTRICULAR INTERNAL DIMENSION IN DIASTOLE: 4.44 CM (ref 3.5–6)
LEFT VENTRICULAR MASS: 148.96 G
Lab: NORMAL
MV PEAK E VEL: 0.89 M/S
PISA TR MAX VEL: 2.27 M/S
RA MAJOR: 5.52 CM
RA PRESSURE: 3 MMHG
RA WIDTH: 4.19 CM
RIGHT VENTRICULAR END-DIASTOLIC DIMENSION: 4.39 CM
RV TISSUE DOPPLER FREE WALL SYSTOLIC VELOCITY 1 (APICAL 4 CHAMBER VIEW): 9.55 CM/S
SERUM COLLECTION DT - LUMINEX CLASS I: NORMAL
SERUM COLLECTION DT - LUMINEX CLASS II: NORMAL
SINUS: 3.2 CM
SPCL1 TESTING DATE: NORMAL
SPCL2 TESTING DATE: NORMAL
SPCLU TESTING DATE: NORMAL
STJ: 2.36 CM
TR MAX PG: 21 MMHG
TRICUSPID ANNULAR PLANE SYSTOLIC EXCURSION: 1.51 CM
TV REST PULMONARY ARTERY PRESSURE: 24 MMHG

## 2021-01-01 PROCEDURE — 86832 HLA CLASS I HIGH DEFIN QUAL: CPT | Mod: PO,TXP

## 2021-01-01 PROCEDURE — 94618 PULMONARY STRESS TESTING: CPT | Mod: PBBFAC,TXP | Performed by: INTERNAL MEDICINE

## 2021-01-01 PROCEDURE — 99001 SPECIMEN HANDLING PT-LAB: CPT | Mod: PO,TXP | Performed by: NURSE PRACTITIONER

## 2021-01-01 PROCEDURE — 93306 ECHO (CUPID ONLY): ICD-10-PCS | Mod: 26,TXP,, | Performed by: INTERNAL MEDICINE

## 2021-01-01 PROCEDURE — 94618 PULMONARY STRESS TESTING: ICD-10-PCS | Mod: 26,S$PBB,TXP, | Performed by: INTERNAL MEDICINE

## 2021-01-01 PROCEDURE — 94618 PULMONARY STRESS TESTING: CPT | Mod: 26,S$PBB,TXP, | Performed by: INTERNAL MEDICINE

## 2021-01-01 PROCEDURE — 86833 HLA CLASS II HIGH DEFIN QUAL: CPT | Mod: PO,TXP

## 2021-01-01 PROCEDURE — 93306 TTE W/DOPPLER COMPLETE: CPT | Mod: TXP

## 2021-01-01 PROCEDURE — 93306 TTE W/DOPPLER COMPLETE: CPT | Mod: 26,TXP,, | Performed by: INTERNAL MEDICINE

## 2021-05-17 ENCOUNTER — POST MORTEM DOCUMENTATION (OUTPATIENT)
Dept: TRANSPLANT | Facility: CLINIC | Age: 71
End: 2021-05-17